# Patient Record
Sex: MALE | Race: WHITE | Employment: PART TIME | URBAN - NONMETROPOLITAN AREA
[De-identification: names, ages, dates, MRNs, and addresses within clinical notes are randomized per-mention and may not be internally consistent; named-entity substitution may affect disease eponyms.]

---

## 2018-04-27 ENCOUNTER — HOSPITAL ENCOUNTER (EMERGENCY)
Age: 33
Discharge: HOME | End: 2018-04-27
Payer: COMMERCIAL

## 2018-04-27 VITALS
HEIGHT: 68 IN | HEART RATE: 65 BPM | BODY MASS INDEX: 33.34 KG/M2 | RESPIRATION RATE: 18 BRPM | WEIGHT: 220 LBS | OXYGEN SATURATION: 99 % | SYSTOLIC BLOOD PRESSURE: 134 MMHG | TEMPERATURE: 98 F | DIASTOLIC BLOOD PRESSURE: 75 MMHG

## 2018-04-27 DIAGNOSIS — L03.012 CELLULITIS OF FINGER OF LEFT HAND: Primary | ICD-10-CM

## 2018-04-27 PROCEDURE — 90715 TDAP VACCINE 7 YRS/> IM: CPT | Performed by: NURSE PRACTITIONER

## 2018-04-27 PROCEDURE — 99213 OFFICE O/P EST LOW 20 MIN: CPT | Performed by: NURSE PRACTITIONER

## 2018-04-27 PROCEDURE — 99213 OFFICE O/P EST LOW 20 MIN: CPT

## 2018-04-27 PROCEDURE — 90471 IMMUNIZATION ADMIN: CPT | Performed by: NURSE PRACTITIONER

## 2018-04-27 PROCEDURE — 6360000002 HC RX W HCPCS: Performed by: NURSE PRACTITIONER

## 2018-04-27 RX ORDER — AMOXICILLIN AND CLAVULANATE POTASSIUM 875; 125 MG/1; MG/1
1 TABLET, FILM COATED ORAL 2 TIMES DAILY
Qty: 20 TABLET | Refills: 0 | Status: SHIPPED | OUTPATIENT
Start: 2018-04-27 | End: 2018-05-07

## 2018-04-27 RX ORDER — ZINC GLUCONATE 50 MG
50 TABLET ORAL DAILY
COMMUNITY
End: 2022-05-16 | Stop reason: ALTCHOICE

## 2018-04-27 RX ADMIN — TETANUS TOXOID, REDUCED DIPHTHERIA TOXOID AND ACELLULAR PERTUSSIS VACCINE, ADSORBED 0.5 ML: 5; 2.5; 8; 8; 2.5 SUSPENSION INTRAMUSCULAR at 17:57

## 2018-04-27 ASSESSMENT — ENCOUNTER SYMPTOMS
NAUSEA: 0
SHORTNESS OF BREATH: 0
ABDOMINAL PAIN: 0
CHEST TIGHTNESS: 0
VOMITING: 0
COLOR CHANGE: 1

## 2018-04-27 ASSESSMENT — PAIN SCALES - GENERAL: PAINLEVEL_OUTOF10: 6

## 2018-04-27 ASSESSMENT — PAIN DESCRIPTION - PAIN TYPE: TYPE: ACUTE PAIN

## 2018-04-27 ASSESSMENT — PAIN DESCRIPTION - LOCATION: LOCATION: FINGER (COMMENT WHICH ONE)

## 2018-04-27 ASSESSMENT — PAIN DESCRIPTION - ORIENTATION: ORIENTATION: LEFT

## 2018-04-27 ASSESSMENT — PAIN DESCRIPTION - DESCRIPTORS: DESCRIPTORS: PRESSURE

## 2018-07-17 ENCOUNTER — OFFICE VISIT (OUTPATIENT)
Dept: FAMILY MEDICINE CLINIC | Age: 33
End: 2018-07-17

## 2018-07-17 VITALS
WEIGHT: 229.4 LBS | DIASTOLIC BLOOD PRESSURE: 84 MMHG | RESPIRATION RATE: 16 BRPM | HEART RATE: 76 BPM | BODY MASS INDEX: 34.77 KG/M2 | SYSTOLIC BLOOD PRESSURE: 132 MMHG | HEIGHT: 68 IN

## 2018-07-17 DIAGNOSIS — N50.812 PAIN IN LEFT TESTICLE: ICD-10-CM

## 2018-07-17 DIAGNOSIS — R39.11 URINARY HESITANCY: ICD-10-CM

## 2018-07-17 DIAGNOSIS — K21.9 GASTROESOPHAGEAL REFLUX DISEASE WITHOUT ESOPHAGITIS: ICD-10-CM

## 2018-07-17 DIAGNOSIS — L30.9 ECZEMA, UNSPECIFIED TYPE: ICD-10-CM

## 2018-07-17 DIAGNOSIS — Z00.00 WELL ADULT EXAM: Primary | ICD-10-CM

## 2018-07-17 LAB
BACTERIA URINE, POC: NORMAL
BILIRUBIN URINE: 0 MG/DL
BLOOD, URINE: NEGATIVE
CASTS URINE, POC: NORMAL
CLARITY: CLEAR
COLOR: YELLOW
CRYSTALS URINE, POC: NORMAL
EPI CELLS URINE, POC: NORMAL
GLUCOSE URINE: NORMAL
HEMOCCULT STL QL: NORMAL
KETONES, URINE: NEGATIVE
LEUKOCYTE EST, POC: NEGATIVE
NITRITE, URINE: NEGATIVE
PH UA: 6.5 (ref 4.5–8)
PROTEIN UA: NEGATIVE
RBC URINE, POC: NORMAL
SPECIFIC GRAVITY UA: 1.01 (ref 1–1.03)
UROBILINOGEN, URINE: NORMAL
WBC URINE, POC: NORMAL
YEAST URINE, POC: NORMAL

## 2018-07-17 PROCEDURE — 82270 OCCULT BLOOD FECES: CPT | Performed by: FAMILY MEDICINE

## 2018-07-17 PROCEDURE — 81000 URINALYSIS NONAUTO W/SCOPE: CPT | Performed by: FAMILY MEDICINE

## 2018-07-17 PROCEDURE — 99395 PREV VISIT EST AGE 18-39: CPT | Performed by: FAMILY MEDICINE

## 2018-07-17 ASSESSMENT — ENCOUNTER SYMPTOMS
CONSTIPATION: 0
SORE THROAT: 0
SHORTNESS OF BREATH: 0
BACK PAIN: 0
NAUSEA: 0
BLOOD IN STOOL: 0
ABDOMINAL PAIN: 0
EYE PAIN: 0
TROUBLE SWALLOWING: 0
CHEST TIGHTNESS: 0
COUGH: 0

## 2018-07-17 ASSESSMENT — PATIENT HEALTH QUESTIONNAIRE - PHQ9
2. FEELING DOWN, DEPRESSED OR HOPELESS: 0
1. LITTLE INTEREST OR PLEASURE IN DOING THINGS: 0
SUM OF ALL RESPONSES TO PHQ QUESTIONS 1-9: 0
SUM OF ALL RESPONSES TO PHQ9 QUESTIONS 1 & 2: 0

## 2018-07-17 NOTE — PROGRESS NOTES
Subjective:      Patient ID: Nirmal Carter is a 26 y.o. male. Urinary  Pressure  Noted    Frequency  Some        Testicle  pain  On  The left        concern  On  Lipids        Spot  On  Back                  Past Medical History:   Diagnosis Date    Hearing loss     Bilateral     Varicose vein of scrotum     Left      Past Surgical History:   Procedure Laterality Date    ANTERIOR CRUCIATE LIGAMENT REPAIR Left     TESTICLE SURGERY Left 1990    VASECTOMY  1-2012    Dr. Stas Lopez Marital status:      Spouse name: N/A    Number of children: N/A    Years of education: N/A     Occupational History    Not on file. Social History Main Topics    Smoking status: Former Smoker     Packs/day: 0.50     Years: 13.00     Types: Cigarettes     Quit date: 7/8/2013    Smokeless tobacco: Never Used    Alcohol use No    Drug use: No      Comment: Weed, Rarely Quit 2014    Sexual activity: Yes     Partners: Female     Other Topics Concern    Not on file     Social History Narrative    No narrative on file     Family History   Problem Relation Age of Onset    High Cholesterol Mother     High Cholesterol Father      Review of Systems   Constitutional: Negative for fatigue and fever. HENT: Negative for congestion, ear pain, postnasal drip, sore throat and trouble swallowing. Eyes: Negative for pain. Respiratory: Negative for cough, chest tightness and shortness of breath. Cardiovascular: Negative for chest pain, palpitations and leg swelling. Gastrointestinal: Negative for abdominal pain, blood in stool, constipation and nausea. Genitourinary: Negative for difficulty urinating, frequency and urgency. Bed  At  9 and  Up  At  4  Am    Musculoskeletal: Negative for arthralgias, back pain, joint swelling and neck stiffness. Skin: Negative for rash. Neurological: Negative for dizziness, weakness and headaches.    Hematological: Negative for reviewed. Assessment:       Diagnosis Orders   1. Well adult exam  Lipid Panel    Glucose   2. Urinary hesitancy  POC URINE with Microscopic   3. Pain in left testicle     4. Eczema, unspecified type     5. Gastroesophageal reflux disease without esophagitis  POCT occult blood stool         Plan:      Current Outpatient Prescriptions   Medication Sig Dispense Refill    zinc gluconate 50 MG tablet Take 50 mg by mouth daily      ibuprofen (ADVIL;MOTRIN) 200 MG tablet Take 600 mg by mouth every 6 hours as needed for Pain       No current facility-administered medications for this visit.           Results for orders placed or performed in visit on 07/17/18   POC URINE with Microscopic   Result Value Ref Range    Color, UA Yellow     Clarity, UA Clear Clear    Glucose, Ur Neg     Bilirubin Urine 0 mg/dL    Ketones, Urine Negative     Specific Gravity, UA 1.015 1.005 - 1.030    Blood, Urine Negative     pH, UA 6.5 4.5 - 8.0    Protein, UA Negative Negative    Nitrite, Urine Negative     Leukocytes, UA Negative     Urobilinogen, Urine Normal     rbc urine, poc none     wbc urine, poc none     bacteria urine, poc none     yeast urine, poc      casts urine, poc      epi cells urine, poc      crystals urine, poc     POCT occult blood stool   Result Value Ref Range    OCCULT BLOOD FECAL      OCCULT BLOOD FECAL      OCCULT BLOOD FECAL         Orders Placed This Encounter   Procedures    POC URINE with Microscopic    POCT occult blood stool        see prn and motriin  Prn  SEE IN ONE

## 2018-07-30 DIAGNOSIS — Z00.00 WELL ADULT EXAM: ICD-10-CM

## 2018-07-30 LAB
CHOLESTEROL, TOTAL: ABNORMAL MG/DL
CHOLESTEROL/HDL RATIO: ABNORMAL
GLUCOSE BLD-MCNC: NORMAL MG/DL
HDLC SERPL-MCNC: ABNORMAL MG/DL (ref 35–70)
LDL CHOLESTEROL CALCULATED: 184 MG/DL (ref 0–160)
TRIGL SERPL-MCNC: ABNORMAL MG/DL
VLDLC SERPL CALC-MCNC: ABNORMAL MG/DL

## 2018-07-31 ENCOUNTER — TELEPHONE (OUTPATIENT)
Dept: FAMILY MEDICINE CLINIC | Age: 33
End: 2018-07-31

## 2018-07-31 DIAGNOSIS — E78.00 PURE HYPERCHOLESTEROLEMIA: ICD-10-CM

## 2018-07-31 NOTE — TELEPHONE ENCOUNTER
Pt called asking if you got his lab results back? He ask if all came back ok?     Please call pt once addressed

## 2018-08-02 PROBLEM — E78.00 PURE HYPERCHOLESTEROLEMIA: Status: ACTIVE | Noted: 2018-08-02

## 2018-08-02 NOTE — TELEPHONE ENCOUNTER
Cholesterol 260 and ldl 184 and want at least 130 so send diet and repeat around first of December , the glu is good at 84 but chol diet and exercise at 20 minutes 3 times a week . Lab on printer  For  December . ? If form for work ?

## 2018-11-30 ENCOUNTER — TELEPHONE (OUTPATIENT)
Dept: FAMILY MEDICINE CLINIC | Age: 33
End: 2018-11-30

## 2018-11-30 RX ORDER — AZITHROMYCIN 250 MG/1
TABLET, FILM COATED ORAL
Qty: 1 PACKET | Refills: 0 | Status: SHIPPED | OUTPATIENT
Start: 2018-11-30 | End: 2020-10-13 | Stop reason: ALTCHOICE

## 2019-02-15 ENCOUNTER — TELEPHONE (OUTPATIENT)
Dept: FAMILY MEDICINE CLINIC | Age: 34
End: 2019-02-15

## 2019-02-15 RX ORDER — AZITHROMYCIN 250 MG/1
TABLET, FILM COATED ORAL
Qty: 1 PACKET | Refills: 0 | Status: SHIPPED | OUTPATIENT
Start: 2019-02-15 | End: 2020-10-13 | Stop reason: ALTCHOICE

## 2020-08-31 PROBLEM — N20.0 RIGHT KIDNEY STONE: Status: ACTIVE | Noted: 2020-08-01

## 2020-10-13 ENCOUNTER — HOSPITAL ENCOUNTER (EMERGENCY)
Age: 35
Discharge: HOME | End: 2020-10-13
Payer: COMMERCIAL

## 2020-10-13 VITALS
TEMPERATURE: 97.5 F | HEIGHT: 69 IN | BODY MASS INDEX: 33.33 KG/M2 | DIASTOLIC BLOOD PRESSURE: 82 MMHG | OXYGEN SATURATION: 97 % | SYSTOLIC BLOOD PRESSURE: 139 MMHG | WEIGHT: 225 LBS | HEART RATE: 89 BPM | RESPIRATION RATE: 16 BRPM

## 2020-10-13 PROCEDURE — 99212 OFFICE O/P EST SF 10 MIN: CPT

## 2020-10-13 PROCEDURE — 99213 OFFICE O/P EST LOW 20 MIN: CPT | Performed by: NURSE PRACTITIONER

## 2020-10-13 RX ORDER — CEFDINIR 300 MG/1
300 CAPSULE ORAL 2 TIMES DAILY
Qty: 20 CAPSULE | Refills: 0 | Status: SHIPPED | OUTPATIENT
Start: 2020-10-13 | End: 2020-10-23

## 2020-10-13 ASSESSMENT — ENCOUNTER SYMPTOMS
NAUSEA: 0
COUGH: 1
SINUS PRESSURE: 1
SORE THROAT: 0
VOMITING: 0
SHORTNESS OF BREATH: 0

## 2020-10-13 NOTE — ED PROVIDER NOTES
[facility]  Urgent Care Encounter       CHIEF COMPLAINT       Chief Complaint   Patient presents with    Cough     productive pt has picture of bottle he has sputum in       Nurses Notes reviewed and I agree except as noted in the HPI. HISTORY OF PRESENT ILLNESS   Joseph Cabral is a 38 y.o. male who presents relation of sinus pressure, congestion and a productive cough that is been ongoing for the past 6 days. Patient denies any fever, chills, nausea, or vomiting. He states that he has not been taking any medications at home for this. He denies any known sick exposures. The history is provided by the patient. REVIEW OF SYSTEMS     Review of Systems   Constitutional: Negative for chills and fever. HENT: Positive for congestion and sinus pressure. Negative for sore throat. Respiratory: Positive for cough. Negative for shortness of breath. Cardiovascular: Negative for chest pain. Gastrointestinal: Negative for nausea and vomiting. Musculoskeletal: Negative for myalgias. Skin: Negative for rash. Allergic/Immunologic: Negative for environmental allergies. Neurological: Negative for headaches. PAST MEDICAL HISTORY         Diagnosis Date    Hearing loss     Bilateral     Right kidney stone 08/2020    Varicose vein of scrotum     Left        SURGICALHISTORY     Patient  has a past surgical history that includes Vasectomy (1-2012); Anterior cruciate ligament repair (Left, ); and Testicle surgery (Left, 1990). CURRENT MEDICATIONS       Previous Medications    IBUPROFEN (ADVIL;MOTRIN) 200 MG TABLET    Take 600 mg by mouth every 6 hours as needed for Pain    ZINC GLUCONATE 50 MG TABLET    Take 50 mg by mouth daily       ALLERGIES     Patient is is allergic to vicodin [hydrocodone-acetaminophen].     Patients   Immunization History   Administered Date(s) Administered    Tdap (Boostrix, Adacel) 04/27/2018    Tetanus 10/01/2000       FAMILY HISTORY Patient's family history includes High Cholesterol in his father and mother. SOCIAL HISTORY     Patient  reports that he quit smoking about 7 years ago. His smoking use included cigarettes. He has a 6.50 pack-year smoking history. He has never used smokeless tobacco. He reports that he does not drink alcohol or use drugs. PHYSICAL EXAM     ED TRIAGE VITALS  BP: 139/82, Temp: 97.5 °F (36.4 °C), Pulse: 89, Resp: 16, SpO2: 97 %,Estimated body mass index is 33.23 kg/m² as calculated from the following:    Height as of this encounter: 5' 9\" (1.753 m). Weight as of this encounter: 225 lb (102.1 kg). ,No LMP for male patient. Physical Exam  Vitals signs and nursing note reviewed. Constitutional:       General: He is not in acute distress. Appearance: He is well-developed. He is not diaphoretic. HENT:      Right Ear: Tympanic membrane is bulging. Tympanic membrane is not erythematous. Left Ear: Tympanic membrane is bulging. Tympanic membrane is not erythematous. Nose:      Right Sinus: Frontal sinus tenderness present. No maxillary sinus tenderness. Left Sinus: Frontal sinus tenderness present. No maxillary sinus tenderness. Mouth/Throat:      Mouth: Mucous membranes are moist.      Pharynx: Oropharynx is clear. Tonsils: No tonsillar exudate. 0 on the right. 0 on the left. Eyes:      Conjunctiva/sclera:      Right eye: Right conjunctiva is not injected. Left eye: Left conjunctiva is not injected. Pupils: Pupils are equal.   Neck:      Musculoskeletal: Normal range of motion. Cardiovascular:      Rate and Rhythm: Normal rate and regular rhythm. Heart sounds: No murmur. Pulmonary:      Effort: Pulmonary effort is normal. No respiratory distress. Breath sounds: Normal breath sounds. Musculoskeletal:      Right knee: He exhibits normal range of motion. Left knee: He exhibits normal range of motion.    Lymphadenopathy:      Head:      Right side of head: No tonsillar adenopathy. Left side of head: No tonsillar adenopathy. Cervical: No cervical adenopathy. Skin:     General: Skin is warm. Findings: No rash. Neurological:      Mental Status: He is alert and oriented to person, place, and time. Psychiatric:         Behavior: Behavior normal.         DIAGNOSTIC RESULTS     Labs:No results found for this visit on 10/13/20. IMAGING:    No orders to display         EKG:  none    URGENT CARE COURSE:     Vitals:    10/13/20 0856   BP: 139/82   Pulse: 89   Resp: 16   Temp: 97.5 °F (36.4 °C)   SpO2: 97%   Weight: 225 lb (102.1 kg)   Height: 5' 9\" (1.753 m)       Medications - No data to display         PROCEDURES:  None    FINAL IMPRESSION      1. Acute frontal sinusitis, recurrence not specified          DISPOSITION/ PLAN       Discussed with the patient that exam is consistent with frontal sinusitis at this time. Discussed that his lung sounds are clear and I believe that his productive cough is likely related to drainage from his sinuses. Discussed with the patient that he should present to the ER for any worsening symptoms we will plan to treat with oral antibiotics and he is advised to use Mucinex and Sudafed at home. Patient was offered a Covid test today, however he declined stating that he does not believe this is the case and that he will monitor her symptoms at home. He is advised to follow-up on an outpatient basis as needed and is agreeable to plan as discussed. PATIENT REFERRED TO:  Kaushik Weinstein MD  [address] [place] [address] / [facility] [id number]      DISCHARGE MEDICATIONS:  New Prescriptions    CEFDINIR (OMNICEF) 300 MG CAPSULE    Take 1 capsule by mouth 2 times daily for 10 days       Discontinued Medications    AZITHROMYCIN (ZITHROMAX) 250 MG TABLET    Take 2 tabs (500 mg) on Day 1, and take 1 tab (250 mg) on days 2 through 5.     AZITHROMYCIN (ZITHROMAX) 250 MG TABLET    Take 2 tabs (500 mg) on Day 1, and take 1 tab (250 mg) on days 2 through 5.        Current Discharge Medication List          BONI Rivas CNP    (Please note that portions of this note were completed with a voice recognition program. Efforts were made to edit the dictations but occasionally words are mis-transcribed.)           BONI Rivas CNP  10/13/20 5147

## 2020-10-15 ENCOUNTER — CARE COORDINATION (OUTPATIENT)
Dept: FAMILY MEDICINE CLINIC | Age: 35
End: 2020-10-15

## 2020-10-15 NOTE — CARE COORDINATION
Ambulatory Care Coordination  ED Follow up Call    Reason for ED visit:  Sinus congestion, cough   Status:     improved    Did you call your PCP prior to going to the ED? No      Did you receive a discharge instructions from the Emergency Room? Yes  Review of Instructions:     Understands what to report/when to return?:  Yes   Understands discharge instructions?:  Yes   Following discharge instructions?:  Yes   If not why? Are there any new complaints of pain? No  New Pain Meds? No    Constipation prophylaxis needed? No    If you have a wound is the dressing clean, dry, and intact? N/A  Understands wound care regimen? N/A    Are there any other complaints/concerns that you wish to tell your provider? FU appts/Provider: declined   No future appointments. He is feeling better since he started his antibiotic but he knows he needs to follow up with the  About his cholesterol. New Medications?:   Yes      Medication Reconciliation by phone - Yes  Understands Medications? Yes  Taking Medications? Yes  Can you swallow your pills? Yes    Any further needs in the home i.e. Equipment?   No    Link to services in community?:  No   Which services:

## 2022-01-10 ENCOUNTER — HOSPITAL ENCOUNTER (EMERGENCY)
Age: 37
Discharge: HOME | End: 2022-01-10
Payer: COMMERCIAL

## 2022-01-10 VITALS
DIASTOLIC BLOOD PRESSURE: 72 MMHG | HEART RATE: 88 BPM | WEIGHT: 225 LBS | HEIGHT: 68 IN | SYSTOLIC BLOOD PRESSURE: 137 MMHG | TEMPERATURE: 97.4 F | RESPIRATION RATE: 16 BRPM | BODY MASS INDEX: 34.1 KG/M2 | OXYGEN SATURATION: 99 %

## 2022-01-10 DIAGNOSIS — J02.9 ACUTE PHARYNGITIS, UNSPECIFIED ETIOLOGY: Primary | ICD-10-CM

## 2022-01-10 LAB
GROUP A STREP CULTURE, REFLEX: NEGATIVE
REFLEX THROAT C + S: NORMAL

## 2022-01-10 PROCEDURE — 99213 OFFICE O/P EST LOW 20 MIN: CPT

## 2022-01-10 PROCEDURE — 87880 STREP A ASSAY W/OPTIC: CPT

## 2022-01-10 PROCEDURE — 99213 OFFICE O/P EST LOW 20 MIN: CPT | Performed by: NURSE PRACTITIONER

## 2022-01-10 PROCEDURE — G0463 HOSPITAL OUTPT CLINIC VISIT: HCPCS

## 2022-01-10 PROCEDURE — 87070 CULTURE OTHR SPECIMN AEROBIC: CPT

## 2022-01-10 ASSESSMENT — ENCOUNTER SYMPTOMS
NAUSEA: 0
SORE THROAT: 1
SHORTNESS OF BREATH: 0
DIARRHEA: 0
VOMITING: 0
COUGH: 0

## 2022-01-10 ASSESSMENT — PAIN SCALES - GENERAL: PAINLEVEL_OUTOF10: 8

## 2022-01-10 ASSESSMENT — PAIN DESCRIPTION - LOCATION: LOCATION: THROAT

## 2022-01-10 NOTE — ED PROVIDER NOTES
[facility]  Urgent Care Encounter       CHIEF COMPLAINT       Chief Complaint   Patient presents with    Pharyngitis       Nurses Notes reviewed and I agree except as noted in the HPI. HISTORY OF PRESENT ILLNESS   Alon Salinas is a 43 y.o. male who presents for evaluation of sore throat with mild congestion that has been ongoing for the past 3 days. Patient denies any fever, chills, headaches, body aches, cough or congestion. He denies any known sick exposures. Patient denies any concern for COVID at this time. States that he would just like to be tested for strep throat. The history is provided by the patient. REVIEW OF SYSTEMS     Review of Systems   Constitutional: Negative for chills and fever. HENT: Positive for congestion and sore throat. Respiratory: Negative for cough and shortness of breath. Cardiovascular: Negative for chest pain. Gastrointestinal: Negative for diarrhea, nausea and vomiting. Musculoskeletal: Negative for arthralgias and myalgias. Skin: Negative for rash. Allergic/Immunologic: Negative for immunocompromised state. Neurological: Negative for headaches. PAST MEDICAL HISTORY         Diagnosis Date    Hearing loss     Bilateral     Right kidney stone 08/2020    Varicose vein of scrotum     Left        SURGICALHISTORY     Patient  has a past surgical history that includes Vasectomy (1-2012); Anterior cruciate ligament repair (Left, ); and Testicle surgery (Left, 1990). CURRENT MEDICATIONS       Previous Medications    IBUPROFEN (ADVIL;MOTRIN) 200 MG TABLET    Take 400 mg by mouth every 6 hours as needed for Pain     ZINC GLUCONATE 50 MG TABLET    Take 50 mg by mouth daily       ALLERGIES     Patient is is allergic to vicodin [hydrocodone-acetaminophen].     Patients   Immunization History   Administered Date(s) Administered    Tdap (Boostrix, Adacel) 04/27/2018    Tetanus 10/01/2000       FAMILY HISTORY     Patient's family history includes High Cholesterol in his father and mother. SOCIAL HISTORY     Patient  reports that he quit smoking about 8 years ago. His smoking use included cigarettes. He has a 6.50 pack-year smoking history. He has never used smokeless tobacco. He reports that he does not drink alcohol and does not use drugs. PHYSICAL EXAM     ED TRIAGE VITALS  BP: 137/72, Temp: 97.4 °F (36.3 °C), Pulse: 88, Resp: 16, SpO2: 99 %,Estimated body mass index is 34.21 kg/m² as calculated from the following:    Height as of this encounter: 5' 8\" (1.727 m). Weight as of this encounter: 225 lb (102.1 kg). ,No LMP for male patient. Physical Exam  Vitals and nursing note reviewed. Constitutional:       General: He is not in acute distress. Appearance: He is well-developed. He is not diaphoretic. HENT:      Right Ear: Tympanic membrane and ear canal normal.      Left Ear: Tympanic membrane and ear canal normal.      Mouth/Throat:      Pharynx: Posterior oropharyngeal erythema present. Tonsils: No tonsillar exudate. 1+ on the right. 1+ on the left. Eyes:      Conjunctiva/sclera:      Right eye: Right conjunctiva is not injected. Left eye: Left conjunctiva is not injected. Pupils: Pupils are equal.   Cardiovascular:      Rate and Rhythm: Normal rate and regular rhythm. Heart sounds: No murmur heard. Pulmonary:      Effort: Pulmonary effort is normal. No respiratory distress. Breath sounds: Normal breath sounds. Musculoskeletal:      Cervical back: Normal range of motion. Right knee: Normal range of motion. Left knee: Normal range of motion. Lymphadenopathy:      Head:      Right side of head: No tonsillar adenopathy. Left side of head: No tonsillar adenopathy. Cervical: No cervical adenopathy. Skin:     General: Skin is warm. Findings: No rash. Neurological:      Mental Status: He is alert and oriented to person, place, and time.    Psychiatric: Behavior: Behavior normal.         DIAGNOSTIC RESULTS     Labs:  Results for orders placed or performed during the hospital encounter of 01/10/22   STREP A ANTIGEN   Result Value Ref Range    GROUP A STREP CULTURE, REFLEX Negative        IMAGING:    No orders to display         EKG:      URGENT CARE COURSE:     Vitals:    01/10/22 0810   BP: 137/72   Pulse: 88   Resp: 16   Temp: 97.4 °F (36.3 °C)   SpO2: 99%   Weight: 225 lb (102.1 kg)   Height: 5' 8\" (1.727 m)       Medications - No data to display         PROCEDURES:  None    FINAL IMPRESSION      1. Acute pharyngitis, unspecified etiology          DISPOSITION/ PLAN     Strep test was negative at this time. I discussed with the patient that exam is consistent with a viral type illness. Patient continues to decline any further testing for influenza or COVID and states that he will monitor his symptoms at home. He is advised use Tylenol and ibuprofen and to rest and hydrate. He is agreeable to plan as discussed.       PATIENT REFERRED TO:  Daryl Solorio MD  [facility] / [address] [place] [id number]      DISCHARGE MEDICATIONS:  New Prescriptions    No medications on file       Discontinued Medications    No medications on file       Current Discharge Medication List          BONI Vera CNP    (Please note that portions of this note were completed with a voice recognition program. Efforts were made to edit the dictations but occasionally words are mis-transcribed.)          BONI Vera CNP  01/10/22 3300

## 2022-01-13 LAB — THROAT/NOSE CULTURE: NORMAL

## 2022-05-16 ENCOUNTER — OFFICE VISIT (OUTPATIENT)
Dept: FAMILY MEDICINE CLINIC | Age: 37
End: 2022-05-16

## 2022-05-16 VITALS
DIASTOLIC BLOOD PRESSURE: 84 MMHG | RESPIRATION RATE: 16 BRPM | HEIGHT: 68 IN | SYSTOLIC BLOOD PRESSURE: 136 MMHG | HEART RATE: 64 BPM | WEIGHT: 211.5 LBS | BODY MASS INDEX: 32.05 KG/M2

## 2022-05-16 DIAGNOSIS — L03.317 CELLULITIS OF BUTTOCK: Primary | ICD-10-CM

## 2022-05-16 DIAGNOSIS — N20.0 RIGHT KIDNEY STONE: ICD-10-CM

## 2022-05-16 DIAGNOSIS — E78.41 ELEVATED LIPOPROTEIN(A): ICD-10-CM

## 2022-05-16 PROCEDURE — 99203 OFFICE O/P NEW LOW 30 MIN: CPT | Performed by: FAMILY MEDICINE

## 2022-05-16 RX ORDER — CEPHALEXIN 500 MG/1
500 CAPSULE ORAL 3 TIMES DAILY
Qty: 21 CAPSULE | Refills: 0 | Status: SHIPPED | OUTPATIENT
Start: 2022-05-16 | End: 2022-10-26 | Stop reason: ALTCHOICE

## 2022-05-16 SDOH — ECONOMIC STABILITY: FOOD INSECURITY: WITHIN THE PAST 12 MONTHS, YOU WORRIED THAT YOUR FOOD WOULD RUN OUT BEFORE YOU GOT MONEY TO BUY MORE.: NEVER TRUE

## 2022-05-16 SDOH — ECONOMIC STABILITY: FOOD INSECURITY: WITHIN THE PAST 12 MONTHS, THE FOOD YOU BOUGHT JUST DIDN'T LAST AND YOU DIDN'T HAVE MONEY TO GET MORE.: NEVER TRUE

## 2022-05-16 ASSESSMENT — ENCOUNTER SYMPTOMS
COUGH: 0
BACK PAIN: 0
BLOOD IN STOOL: 0
NAUSEA: 0
TROUBLE SWALLOWING: 0
EYE PAIN: 0
SHORTNESS OF BREATH: 0
SORE THROAT: 0
CONSTIPATION: 0
ABDOMINAL PAIN: 0
RHINORRHEA: 0
NAIL CHANGES: 0
CHEST TIGHTNESS: 0

## 2022-05-16 ASSESSMENT — PATIENT HEALTH QUESTIONNAIRE - PHQ9
2. FEELING DOWN, DEPRESSED OR HOPELESS: 0
SUM OF ALL RESPONSES TO PHQ QUESTIONS 1-9: 0
SUM OF ALL RESPONSES TO PHQ9 QUESTIONS 1 & 2: 0
SUM OF ALL RESPONSES TO PHQ QUESTIONS 1-9: 0
1. LITTLE INTEREST OR PLEASURE IN DOING THINGS: 0

## 2022-05-16 ASSESSMENT — SOCIAL DETERMINANTS OF HEALTH (SDOH): HOW HARD IS IT FOR YOU TO PAY FOR THE VERY BASICS LIKE FOOD, HOUSING, MEDICAL CARE, AND HEATING?: NOT HARD AT ALL

## 2022-05-16 NOTE — PROGRESS NOTES
Subjective:      Patient ID: Chinita Kawasaki is a 42 y.o. male. In  2022  soon  erectil  dysf    Rash  This is a new problem. The current episode started in the past 7 days. The problem has been gradually worsening since onset. The affected locations include the left buttock ( left  perianal  area ). The rash is characterized by pain and swelling. He was exposed to nothing. Pertinent negatives include no congestion, cough, eye pain, fatigue, fever, joint pain, nail changes, rhinorrhea, shortness of breath or sore throat. Past treatments include nothing. The treatment provided no relief.      Past Medical History:   Diagnosis Date    Hearing loss     Bilateral     Right kidney stone 2020    Varicose vein of scrotum     Left      Past Surgical History:   Procedure Laterality Date    ANTERIOR CRUCIATE LIGAMENT REPAIR Left     TESTICLE SURGERY Left     VASECTOMY      Dr. Gallegos [place] History     Socioeconomic History    Marital status:      Spouse name: Not on file    Number of children: Not on file    Years of education: Not on file    Highest education level: Not on file   Occupational History    Not on file   Tobacco Use    Smoking status: Former Smoker     Packs/day: 0.50     Years: 13.00     Pack years: 6.50     Types: Cigarettes     Quit date: 2013     Years since quittin.8    Smokeless tobacco: Never Used   Vaping Use    Vaping Use: Never used   Substance and Sexual Activity    Alcohol use: No    Drug use: No     Comment: Weed, Rarely Quit     Sexual activity: Yes     Partners: Female   Other Topics Concern    Not on file   Social History Narrative    Not on file     Social Determinants of Health     Financial Resource Strain: Low Risk     Difficulty of Paying Living Expenses: Not hard at all   Food Insecurity: No Food Insecurity    Worried About [address] in the Last Year: Never true    [facility] in the Last Year: Never true Psychiatric/Behavioral: Negative for behavioral problems, dysphoric mood and sleep disturbance. /84 (Site: Right Upper Arm, Position: Sitting, Cuff Size: Medium Adult)   Pulse 64   Resp 16   Ht 5' 8\" (1.727 m)   Wt 211 lb 8 oz (95.9 kg)   BMI 32.16 kg/m²   Objective:   Physical Exam  Vitals and nursing note reviewed. Constitutional:       Appearance: He is well-developed. HENT:      Head: Normocephalic and atraumatic. Right Ear: External ear normal.      Left Ear: External ear normal.      Nose: Nose normal.   Eyes:      Conjunctiva/sclera: Conjunctivae normal.      Pupils: Pupils are equal, round, and reactive to light. Comments: Fundi nl   Neck:      Thyroid: No thyromegaly. Cardiovascular:      Rate and Rhythm: Normal rate and regular rhythm. Heart sounds: Normal heart sounds. Pulmonary:      Effort: Pulmonary effort is normal.      Breath sounds: Normal breath sounds. No wheezing or rales. Abdominal:      General: Bowel sounds are normal.      Palpations: Abdomen is soft. There is no mass. Tenderness: There is no abdominal tenderness. Genitourinary:     Comments:   Perianal  Area at  2  oclock   Area  small  Ingrown  Hair   Musculoskeletal:         General: Normal range of motion. Cervical back: Normal range of motion and neck supple. Lymphadenopathy:      Cervical: No cervical adenopathy. Skin:     General: Skin is warm and dry. Findings: No rash. Neurological:      Mental Status: He is alert and oriented to person, place, and time. Cranial Nerves: No cranial nerve deficit. Deep Tendon Reflexes: Reflexes are normal and symmetric. Assessment:       Diagnosis Orders   1. Cellulitis of buttock  cephALEXin (KEFLEX) 500 MG capsule   2. Elevated lipoprotein(a)  Lipid Panel   3.  Right kidney stone           Plan:      Current Outpatient Medications   Medication Sig Dispense Refill    cephALEXin (KEFLEX) 500 MG capsule Take 1 capsule by mouth 3 times daily 21 capsule 0    ibuprofen (ADVIL;MOTRIN) 200 MG tablet Take 400 mg by mouth every 6 hours as needed for Pain        No current facility-administered medications for this visit.          Orders Placed This Encounter   Procedures    Lipid Panel     Standing Status:   Future     Standing Expiration Date:   5/16/2023     Order Specific Question:   Is Patient Fasting?/# of Hours     Answer:   YES 12 HOURS           See prkylee Han MD

## 2022-05-25 ENCOUNTER — TELEPHONE (OUTPATIENT)
Dept: FAMILY MEDICINE CLINIC | Age: 37
End: 2022-05-25

## 2022-05-25 NOTE — TELEPHONE ENCOUNTER
Pt called requesting a letter to be excused from jury duty because of his tinnitus which resulted in hearing loss. 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     Pt can be reached at [phone]

## 2022-05-26 NOTE — TELEPHONE ENCOUNTER
I believe by definition there are diagnosis that are acceptable to be excused as cardiac or lung or diabetic if on insulin plus with his hearing he has no hearing aide so not able to do    Please inform

## 2022-10-26 ENCOUNTER — OFFICE VISIT (OUTPATIENT)
Dept: FAMILY MEDICINE CLINIC | Age: 37
End: 2022-10-26

## 2022-10-26 VITALS
HEIGHT: 68 IN | SYSTOLIC BLOOD PRESSURE: 134 MMHG | DIASTOLIC BLOOD PRESSURE: 78 MMHG | RESPIRATION RATE: 12 BRPM | HEART RATE: 60 BPM | WEIGHT: 217.25 LBS | BODY MASS INDEX: 32.92 KG/M2

## 2022-10-26 DIAGNOSIS — E78.00 PURE HYPERCHOLESTEROLEMIA: ICD-10-CM

## 2022-10-26 DIAGNOSIS — R07.89 CHEST PAIN, ATYPICAL: Primary | ICD-10-CM

## 2022-10-26 DIAGNOSIS — J30.1 SEASONAL ALLERGIC RHINITIS DUE TO POLLEN: ICD-10-CM

## 2022-10-26 LAB
CHOLESTEROL, TOTAL: 275 MG/DL
CHOLESTEROL/HDL RATIO: ABNORMAL
HDLC SERPL-MCNC: 49 MG/DL (ref 35–70)
LDL CHOLESTEROL CALCULATED: 188 MG/DL (ref 0–160)
NONHDLC SERPL-MCNC: ABNORMAL MG/DL
TRIGL SERPL-MCNC: 203 MG/DL
VLDLC SERPL CALC-MCNC: 38 MG/DL

## 2022-10-26 PROCEDURE — 99214 OFFICE O/P EST MOD 30 MIN: CPT | Performed by: FAMILY MEDICINE

## 2022-10-26 PROCEDURE — 93000 ELECTROCARDIOGRAM COMPLETE: CPT | Performed by: FAMILY MEDICINE

## 2022-10-26 ASSESSMENT — ENCOUNTER SYMPTOMS
EYE PAIN: 0
SHORTNESS OF BREATH: 0
COUGH: 0
CONSTIPATION: 0
CHEST TIGHTNESS: 0
TROUBLE SWALLOWING: 0
BACK PAIN: 0
ABDOMINAL PAIN: 0
BLOOD IN STOOL: 0
NAUSEA: 0
SORE THROAT: 0

## 2022-10-26 NOTE — PROGRESS NOTES
Subjective:      Patient ID: Elin Del Rio is a 42 y.o. male. Other  This is a new problem. The current episode started more than 1 month ago (once   in past  month). Episode frequency: 2  times  only. Progression since onset: last   for  5   minutes. Associated symptoms include congestion. Pertinent negatives include no abdominal pain, arthralgias, chest pain, coughing, fatigue, fever, headaches, joint swelling, nausea, rash, sore throat or weakness. Nothing aggravates the symptoms. He has tried nothing for the symptoms. The treatment provided mild relief. Hyperlipidemia  This is a chronic problem. The current episode started more than 1 month ago. The problem is uncontrolled. Pertinent negatives include no chest pain or shortness of breath. Current antihyperlipidemic treatment includes diet change. The current treatment provides mild improvement of lipids. There are no compliance problems.     Past Medical History:   Diagnosis Date    Hearing loss     Bilateral     Right kidney stone 2020    Varicose vein of scrotum     Left      Past Surgical History:   Procedure Laterality Date    ANTERIOR CRUCIATE LIGAMENT REPAIR Left     TESTICLE SURGERY Left     VASECTOMY      Dr. Liane Krabbe History    Marital status:      Spouse name: Not on file    Number of children: Not on file    Years of education: Not on file    Highest education level: Not on file   Occupational History    Not on file   Tobacco Use    Smoking status: Former     Packs/day: 0.50     Years: 13.00     Pack years: 6.50     Types: Cigarettes     Quit date: 2013     Years since quittin.3    Smokeless tobacco: Never   Vaping Use    Vaping Use: Never used   Substance and Sexual Activity    Alcohol use: No    Drug use: No     Comment: Weed, Rarely Quit     Sexual activity: Yes     Partners: Female   Other Topics Concern    Not on file   Social History Narrative    Not on file     Social Determinants of Health     Financial Resource Strain: Low Risk     Difficulty of Paying Living Expenses: Not hard at all   Food Insecurity: No Food Insecurity    Worried About Running Out of Food in the Last Year: Never true    Ran Out of Food in the Last Year: Never true   Transportation Needs: Not on file   Physical Activity: Not on file   Stress: Not on file   Social Connections: Not on file   Intimate Partner Violence: Not on file   Housing Stability: Not on file     Family History   Problem Relation Age of Onset    High Cholesterol Mother     High Cholesterol Father       Review of Systems   Constitutional:  Negative for fatigue and fever. HENT:  Positive for congestion. Negative for ear pain, postnasal drip, sore throat and trouble swallowing. Eyes:  Negative for pain. Respiratory:  Negative for cough, chest tightness and shortness of breath. Cardiovascular:  Negative for chest pain, palpitations and leg swelling. Gastrointestinal:  Negative for abdominal pain, blood in stool, constipation and nausea. Genitourinary:  Negative for difficulty urinating, frequency and urgency. Musculoskeletal:  Negative for arthralgias, back pain, joint swelling and neck stiffness. At  larynx  with pain and   tightness and  felt  in  upper  chest       Vague pain        No  dyspnea    Skin:  Negative for rash. Neurological:  Negative for dizziness, weakness and headaches. Hematological:  Negative for adenopathy. Does not bruise/bleed easily. Psychiatric/Behavioral:  Negative for behavioral problems, dysphoric mood and sleep disturbance. /78 (Site: Right Upper Arm, Position: Sitting, Cuff Size: Medium Adult)   Pulse 60   Resp 12   Ht 5' 8\" (1.727 m)   Wt 217 lb 4 oz (98.5 kg)   BMI 33.03 kg/m²    Objective:   Physical Exam  Vitals and nursing note reviewed. Constitutional:       Appearance: He is well-developed. HENT:      Head: Normocephalic and atraumatic.       Right Ear: External ear normal.      Left Ear: External ear normal.      Nose: Congestion present. Mouth/Throat:      Pharynx: Posterior oropharyngeal erythema present. Comments: Post pharynx drainage  and  redness  noted    Eyes:      Conjunctiva/sclera: Conjunctivae normal.      Pupils: Pupils are equal, round, and reactive to light. Comments: Fundi nl   Neck:      Thyroid: No thyromegaly. Cardiovascular:      Rate and Rhythm: Normal rate and regular rhythm. Heart sounds: Normal heart sounds. Pulmonary:      Effort: Pulmonary effort is normal.      Breath sounds: Normal breath sounds. No wheezing or rales. Abdominal:      General: Bowel sounds are normal.      Palpations: Abdomen is soft. There is no mass. Tenderness: There is no abdominal tenderness. Musculoskeletal:         General: Normal range of motion. Cervical back: Normal range of motion and neck supple. Lymphadenopathy:      Cervical: No cervical adenopathy. Skin:     General: Skin is warm and dry. Findings: No rash. Neurological:      Mental Status: He is alert and oriented to person, place, and time. Cranial Nerves: No cranial nerve deficit. Deep Tendon Reflexes: Reflexes are normal and symmetric.     ,  ekg  wnl as  normal  pr and  qrs and  st  segment   Assessment:        ICD-10-CM    1. Chest pain, atypical  R07.89 EKG 12 Lead      2. Seasonal allergic rhinitis due to pollen  J30.1       3. Pure hypercholesterolemia  E78.00              Plan:      Current Outpatient Medications   Medication Sig Dispense Refill    ibuprofen (ADVIL;MOTRIN) 200 MG tablet Take 400 mg by mouth every 6 hours as needed for Pain        No current facility-administered medications for this visit.           Outpatient Encounter Medications as of 10/26/2022   Medication Sig Dispense Refill    ibuprofen (ADVIL;MOTRIN) 200 MG tablet Take 400 mg by mouth every 6 hours as needed for Pain       [DISCONTINUED] cephALEXin (KEFLEX) 500 MG capsule Take 1 capsule by mouth 3 times daily 21 capsule 0     No facility-administered encounter medications on file as of 10/26/2022. Orders Placed This Encounter   Procedures    EKG 12 Lead     Order Specific Question:   Reason for Exam?     Answer:    Atypical chest pain          Aypical    chest pain  in  throat       allergy  related  as  drainage       Chol    up and     may  need   meds       [facility]  for  darcie Lopez MD

## 2022-10-27 DIAGNOSIS — E78.41 ELEVATED LIPOPROTEIN(A): ICD-10-CM

## 2022-10-28 ENCOUNTER — TELEPHONE (OUTPATIENT)
Dept: FAMILY MEDICINE CLINIC | Age: 37
End: 2022-10-28

## 2022-10-28 DIAGNOSIS — E78.00 PURE HYPERCHOLESTEROLEMIA: Primary | ICD-10-CM

## 2022-10-28 RX ORDER — ROSUVASTATIN CALCIUM 10 MG/1
10 TABLET, COATED ORAL NIGHTLY
Qty: 30 TABLET | Refills: 3 | Status: SHIPPED | OUTPATIENT
Start: 2022-10-28 | End: 2022-11-23

## 2022-10-28 NOTE — TELEPHONE ENCOUNTER
----- Message from Stephanie Bustos MD sent at 10/28/2022  5:51 AM EDT -----  Chol 275  still and  ldl  188 so do need to check liver function now and if ok start crestor 10 mg a day  Hepatic panel on printer

## 2022-10-28 NOTE — TELEPHONE ENCOUNTER
Maury Dove informed by [employer] labs to [employer] for pt and he agreed to [employer]. Sent RX to pharmacy for pt.     Date of last visit:  10/26/2022  Date of next visit:  1/16/2023    Requested Prescriptions     Signed Prescriptions Disp Refills    rosuvastatin (CRESTOR) 10 MG tablet 30 tablet 3     Sig: Take 1 tablet by mouth nightly     Authorizing Provider: Jennifer Corado     Ordering User: Sherine Francois

## 2022-10-31 LAB
A/G RATIO: NORMAL
ALBUMIN SERPL-MCNC: 4.9 G/DL
ALP BLD-CCNC: 88 U/L
ALT SERPL-CCNC: 23 U/L
AST SERPL-CCNC: 22 U/L
BILIRUB SERPL-MCNC: 0.7 MG/DL (ref 0.1–1.4)
BILIRUBIN DIRECT: 0.15 MG/DL
BILIRUBIN, INDIRECT: NORMAL
GLOBULIN: NORMAL
PROTEIN TOTAL: 7.2 G/DL

## 2022-11-01 DIAGNOSIS — E78.00 PURE HYPERCHOLESTEROLEMIA: ICD-10-CM

## 2022-11-02 ENCOUNTER — TELEPHONE (OUTPATIENT)
Dept: FAMILY MEDICINE CLINIC | Age: 37
End: 2022-11-02

## 2022-11-02 DIAGNOSIS — E78.00 PURE HYPERCHOLESTEROLEMIA: Primary | ICD-10-CM

## 2022-11-02 NOTE — TELEPHONE ENCOUNTER
----- Message from Jt Valenzuela MD sent at 11/2/2022  8:51 AM EDT -----  Call as liver functions normal and  start  crestor and around  2-1-2023 repeat liver functions and lipid panel to see  how the meds are doing       Labs  on printer and  do  about  2-1-2023

## 2022-11-22 DIAGNOSIS — E78.00 PURE HYPERCHOLESTEROLEMIA: ICD-10-CM

## 2022-11-22 NOTE — TELEPHONE ENCOUNTER
Date of last visit:  10/26/2022  Date of next visit:  1/16/2023    Requested Prescriptions     Pending Prescriptions Disp Refills    rosuvastatin (CRESTOR) 10 MG tablet [Pharmacy Med Name: ROSUVASTATIN CALCIUM 10 MG TAB] 30 tablet 3     Sig: TAKE 1 TABLET BY MOUTH NIGHTLY

## 2022-11-23 RX ORDER — ROSUVASTATIN CALCIUM 10 MG/1
10 TABLET, COATED ORAL NIGHTLY
Qty: 30 TABLET | Refills: 3 | Status: SHIPPED | OUTPATIENT
Start: 2022-11-23

## 2022-11-28 ENCOUNTER — TELEPHONE (OUTPATIENT)
Dept: FAMILY MEDICINE CLINIC | Age: 37
End: 2022-11-28

## 2022-11-28 DIAGNOSIS — J06.9 VIRAL URI: Primary | ICD-10-CM

## 2022-11-28 RX ORDER — AZITHROMYCIN 250 MG/1
250 TABLET, FILM COATED ORAL SEE ADMIN INSTRUCTIONS
Qty: 6 TABLET | Refills: 0 | Status: SHIPPED | OUTPATIENT
Start: 2022-11-28 | End: 2022-12-03

## 2022-11-28 NOTE — TELEPHONE ENCOUNTER
Pt called req ATB for   sinus congestion,cough,thick green chest mucus,yellow sinus drainage    [facility]

## 2023-01-16 ENCOUNTER — OFFICE VISIT (OUTPATIENT)
Dept: FAMILY MEDICINE CLINIC | Age: 38
End: 2023-01-16

## 2023-01-16 VITALS
SYSTOLIC BLOOD PRESSURE: 130 MMHG | HEIGHT: 68 IN | WEIGHT: 230.2 LBS | DIASTOLIC BLOOD PRESSURE: 72 MMHG | HEART RATE: 80 BPM | BODY MASS INDEX: 34.89 KG/M2 | RESPIRATION RATE: 14 BRPM

## 2023-01-16 DIAGNOSIS — E78.00 PURE HYPERCHOLESTEROLEMIA: ICD-10-CM

## 2023-01-16 DIAGNOSIS — Z00.00 WELLNESS EXAMINATION: Primary | ICD-10-CM

## 2023-01-16 DIAGNOSIS — J30.1 SEASONAL ALLERGIC RHINITIS DUE TO POLLEN: ICD-10-CM

## 2023-01-16 DIAGNOSIS — N20.0 RIGHT KIDNEY STONE: ICD-10-CM

## 2023-01-16 PROCEDURE — 99395 PREV VISIT EST AGE 18-39: CPT | Performed by: FAMILY MEDICINE

## 2023-01-16 ASSESSMENT — PATIENT HEALTH QUESTIONNAIRE - PHQ9
SUM OF ALL RESPONSES TO PHQ9 QUESTIONS 1 & 2: 0
1. LITTLE INTEREST OR PLEASURE IN DOING THINGS: 0
2. FEELING DOWN, DEPRESSED OR HOPELESS: 0
SUM OF ALL RESPONSES TO PHQ QUESTIONS 1-9: 0

## 2023-01-16 ASSESSMENT — ENCOUNTER SYMPTOMS
COUGH: 0
SORE THROAT: 0
EYE PAIN: 0
ABDOMINAL PAIN: 0
NAUSEA: 0
TROUBLE SWALLOWING: 0
BACK PAIN: 0
CONSTIPATION: 0
CHEST TIGHTNESS: 0
BLOOD IN STOOL: 0
SHORTNESS OF BREATH: 0

## 2023-01-16 NOTE — PROGRESS NOTES
CARE VISIT    Marcin Teixeira  YOB: 1985    Date of Service:  1/16/2023    Chief Complaint:   Marcin Teixeira is a 42 y.o. male who presents for Comprehensive Annual Evaluation    Patient Active Problem List    Diagnosis Date Noted    Allergic rhinitis 09/17/2010     Priority: Medium    Right kidney stone 08/2020    Pure hypercholesterolemia 08/02/2018      works  at    ups  noted       Hyperlipid   on  the  crestor       Allergies  stable         Kidney  stone    right  hip pain     Preventive Care:  Last eye exam:  staBLE    YEARLY   Exercise: no regular exercise  WILL  WALK   Fracture within the past 6 months: NONE      Living will:    NEEDS    will  noted      Review of Systems   Constitutional:  Negative for fatigue and fever. HENT:  Negative for congestion, ear pain, postnasal drip, sore throat and trouble swallowing. Eyes:  Negative for pain. Respiratory:  Negative for cough, chest tightness and shortness of breath. Cardiovascular:  Negative for chest pain, palpitations and leg swelling. Gastrointestinal:  Negative for abdominal pain, blood in stool, constipation and nausea. Genitourinary:  Negative for difficulty urinating, frequency and urgency. Musculoskeletal:  Negative for arthralgias, back pain, joint swelling and neck stiffness. Skin:  Negative for rash. Neurological:  Negative for dizziness, weakness and headaches. Hematological:  Negative for adenopathy. Does not bruise/bleed easily. Psychiatric/Behavioral:  Negative for behavioral problems, dysphoric mood and sleep disturbance. Allergies   Allergen Reactions    Vicodin [Hydrocodone-Acetaminophen] Itching     Prior to Visit Medications    Medication Sig Taking?  Authorizing Provider   rosuvastatin (CRESTOR) 10 MG tablet TAKE 1 TABLET BY MOUTH NIGHTLY Yes Jewel Puckett MD   ibuprofen (ADVIL;MOTRIN) 200 MG tablet Take 400 mg by mouth every 6 hours as needed for Pain  Yes Historical Provider, MD       Past Medical History:   Diagnosis Date    Hearing loss     Bilateral     Right kidney stone 2020    Varicose vein of scrotum     Left      Past Surgical History:   Procedure Laterality Date    ANTERIOR CRUCIATE LIGAMENT REPAIR Left     TESTICLE SURGERY Left     VASECTOMY      Dr. Palomares      Family History   Problem Relation Age of Onset    High Cholesterol Mother     High Cholesterol Father      Social History     Socioeconomic History    Marital status:      Spouse name: Not on file    Number of children: Not on file    Years of education: Not on file    Highest education level: Not on file   Occupational History    Not on file   Tobacco Use    Smoking status: Former     Packs/day: 0.50     Years: 13.00     Pack years: 6.50     Types: Cigarettes     Quit date: 2013     Years since quittin.5    Smokeless tobacco: Never   Vaping Use    Vaping Use: Never used   Substance and Sexual Activity    Alcohol use: No    Drug use: No     Comment: Weed, Rarely Quit     Sexual activity: Yes     Partners: Female   Other Topics Concern    Not on file   Social History Narrative    Not on file     Social Determinants of Health     Financial Resource Strain: Low Risk     Difficulty of Paying Living Expenses: Not hard at all   Food Insecurity: No Food Insecurity    Worried About Running Out of Food in the Last Year: Never true    Ran Out of Food in the Last Year: Never true   Transportation Needs: Not on file   Physical Activity: Not on file   Stress: Not on file   Social Connections: Not on file   Intimate Partner Violence: Not on file   Housing Stability: Not on file       Wt Readings from Last 3 Encounters:   23 230 lb 3.2 oz (104.4 kg)   10/26/22 217 lb 4 oz (98.5 kg)   22 211 lb 8 oz (95.9 kg)     BP Readings from Last 3 Encounters:   23 130/72   10/26/22 134/78   22 136/84        Vitals:    23 1428   BP: 130/72   Site: Right Upper Arm   Position: Sitting   Cuff Size:  Medium Adult   Pulse: 80   Resp: 14   Weight: 230 lb 3.2 oz (104.4 kg)   Height: 5' 8\" (1.727 m)     Body mass index is 35 kg/m². Physical Exam  Vitals and nursing note reviewed. Constitutional:       Appearance: He is well-developed. HENT:      Head: Normocephalic and atraumatic. Right Ear: External ear normal.      Left Ear: External ear normal.      Nose: Nose normal.   Eyes:      Conjunctiva/sclera: Conjunctivae normal.      Pupils: Pupils are equal, round, and reactive to light. Comments: Fundi nl   Neck:      Thyroid: No thyromegaly. Cardiovascular:      Rate and Rhythm: Normal rate and regular rhythm. Heart sounds: Normal heart sounds. Pulmonary:      Effort: Pulmonary effort is normal.      Breath sounds: Normal breath sounds. No wheezing or rales. Abdominal:      General: Bowel sounds are normal.      Palpations: Abdomen is soft. There is no mass. Tenderness: There is no abdominal tenderness. Musculoskeletal:         General: Normal range of motion. Cervical back: Normal range of motion and neck supple. Lymphadenopathy:      Cervical: No cervical adenopathy. Skin:     General: Skin is warm and dry. Findings: No rash. Neurological:      Mental Status: He is alert and oriented to person, place, and time. Cranial Nerves: No cranial nerve deficit. Deep Tendon Reflexes: Reflexes are normal and symmetric.        Lipid panel:   Lab Results   Component Value Date    CHOL 275 10/26/2022    TRIG 203 10/26/2022    HDL 49 10/26/2022    LDLCALC 188 (A) 10/26/2022     Glucose:   Glucose (mg/dL)   Date Value   08/29/2020 84       Patient Care Team:  Monica Arias MD as PCP - General (Family Medicine)  Monica Arias MD as PCP - [facility] Provider    Immunization History   Administered Date(s) Administered    DTaP vaccine [birth year]    Polio IPV (IPOL) 1985, 1985    Tdap (Boostrix, Adacel) 04/27/2018    Tetanus 10/01/2000 Health Maintenance Due   Topic Date Due    COVID-19 Vaccine (1) Never done    Varicella vaccine (1 of 2 - 2-dose childhood series) Never done    HIV screen  Never done    Hepatitis C screen  Never done    Flu vaccine (1) Never done        Assessment/    ICD-10-CM    1. Wellness examination  Z00.00       2. Pure hypercholesterolemia  E78.00       3. Seasonal allergic rhinitis due to pollen  J30.1       4. Right kidney stone  N20.0            Plan:      Current Outpatient Medications   Medication Sig Dispense Refill    rosuvastatin (CRESTOR) 10 MG tablet TAKE 1 TABLET BY MOUTH NIGHTLY 30 tablet 3    ibuprofen (ADVIL;MOTRIN) 200 MG tablet Take 400 mg by mouth every 6 hours as needed for Pain        No current facility-administered medications for this visit. Get lab    in late   April     No orders of the defined types were placed in this encounter. There are no diagnoses linked to this encounter. No follow-ups on file.

## 2023-02-23 DIAGNOSIS — E78.00 PURE HYPERCHOLESTEROLEMIA: ICD-10-CM

## 2023-02-23 RX ORDER — ROSUVASTATIN CALCIUM 10 MG/1
10 TABLET, COATED ORAL NIGHTLY
Qty: 90 TABLET | Refills: 1 | Status: SHIPPED | OUTPATIENT
Start: 2023-02-23

## 2023-02-23 NOTE — TELEPHONE ENCOUNTER
Date of last visit:  1/16/2023  Date of next visit:  Visit date not found    Requested Prescriptions     Pending Prescriptions Disp Refills    rosuvastatin (CRESTOR) 10 MG tablet [Pharmacy Med Name: ROSUVASTATIN CALCIUM 10 MG TAB] 90 tablet 1     Sig: TAKE 1 TABLET BY MOUTH NIGHTLY

## 2023-02-28 ENCOUNTER — CARE COORDINATION (OUTPATIENT)
Dept: FAMILY MEDICINE CLINIC | Age: 38
End: 2023-02-28

## 2023-03-01 ENCOUNTER — TELEPHONE (OUTPATIENT)
Dept: FAMILY MEDICINE CLINIC | Age: 38
End: 2023-03-01

## 2023-03-01 NOTE — TELEPHONE ENCOUNTER
Pt called stating he is having back/kidney pain today. On a Pain scale 1-5, he is at a 3 per pt. Pt is not having any urinary issues or blood with urination. Pt ask if you have any ideas what this may be or if this is normal after passing stones?     [facility]

## 2023-03-02 NOTE — TELEPHONE ENCOUNTER
Anthony Love informed by Faviola. He stated he is feeling a lot better today.  He will keep his Follow Up appt on 3-7-2023

## 2023-03-02 NOTE — TELEPHONE ENCOUNTER
He was in the hospital  with kidney stone and fell more related to this .   Did he have a cystoscopy or a stent placed as this will cause some discomfort

## 2023-03-03 NOTE — CARE COORDINATION
Care Transitions Initial Follow Up Call    Call within 2 business days of discharge: Yes     Patient: Lotus Delatorre Patient : 1985 MRN: [id number]    Last Discharge  Street       Date Complaint Diagnosis Description Type Department Provider    1/10/22 Pharyngitis Acute pharyngitis, unspecified etiology [facility] (DISCHARGE) [facility]             RARS: No data recorded     Spoke with: Cecilia Bryant    Discharge department/facility: [facility] 2023    Non-face-to-face services provided:  Scheduled appointment with PCP-yes  Scheduled appointment with Specialist-yes  Obtained and reviewed discharge summary and/or continuity of care documents  Reviewed and followed up on pending diagnostic tests and treatments-yes  Communication with specialists who will assume or re-assume care of the patient's system-specific problems-yes  Education of patient/family/caregiver/guardian to support self-management-yes  Assessment and support for treatment adherence and medication management-yes  Establishment or re-establishment of referrals-n/a  Assistance in accessing community resources-n/a    Follow Up  Future Appointments   Date Time Provider Hugo Silvestre   3/7/2023 11:10 AM Erasmo Merrill MD [facility] [employer]   2024  2:00 PM Erasmo Merrill  [address] is glad to be home. He was not happy with the care as they did not even strain his urine and the only way he knew the stones had passed was from the second CT. He is feeling much better now. He has no new medications. He made a follow up appointment and I will see him then.     Guero Abreu RN

## 2023-03-07 ENCOUNTER — CARE COORDINATION (OUTPATIENT)
Dept: FAMILY MEDICINE CLINIC | Age: 38
End: 2023-03-07

## 2023-03-07 ENCOUNTER — OFFICE VISIT (OUTPATIENT)
Dept: FAMILY MEDICINE CLINIC | Age: 38
End: 2023-03-07

## 2023-03-07 VITALS
HEART RATE: 76 BPM | HEIGHT: 68 IN | SYSTOLIC BLOOD PRESSURE: 142 MMHG | RESPIRATION RATE: 14 BRPM | DIASTOLIC BLOOD PRESSURE: 82 MMHG | WEIGHT: 225.25 LBS | BODY MASS INDEX: 34.14 KG/M2

## 2023-03-07 DIAGNOSIS — Z09 HOSPITAL DISCHARGE FOLLOW-UP: ICD-10-CM

## 2023-03-07 DIAGNOSIS — N20.0 RIGHT KIDNEY STONE: Primary | ICD-10-CM

## 2023-03-07 DIAGNOSIS — H69.81 EUSTACHIAN TUBE DYSFUNCTION, RIGHT: ICD-10-CM

## 2023-03-07 DIAGNOSIS — G43.109 MIGRAINE WITH AURA AND WITHOUT STATUS MIGRAINOSUS, NOT INTRACTABLE: ICD-10-CM

## 2023-03-07 RX ORDER — TAMSULOSIN HYDROCHLORIDE 0.4 MG/1
0.4 CAPSULE ORAL DAILY
Qty: 15 CAPSULE | Refills: 0 | Status: SHIPPED | OUTPATIENT
Start: 2023-03-07

## 2023-03-07 RX ORDER — KETOROLAC TROMETHAMINE 10 MG/1
10 TABLET, FILM COATED ORAL EVERY 6 HOURS PRN
Qty: 20 TABLET | Refills: 0 | Status: SHIPPED | OUTPATIENT
Start: 2023-03-07 | End: 2024-03-06

## 2023-03-07 SDOH — ECONOMIC STABILITY: FOOD INSECURITY: WITHIN THE PAST 12 MONTHS, YOU WORRIED THAT YOUR FOOD WOULD RUN OUT BEFORE YOU GOT MONEY TO BUY MORE.: NEVER TRUE

## 2023-03-07 SDOH — ECONOMIC STABILITY: FOOD INSECURITY: WITHIN THE PAST 12 MONTHS, THE FOOD YOU BOUGHT JUST DIDN'T LAST AND YOU DIDN'T HAVE MONEY TO GET MORE.: NEVER TRUE

## 2023-03-07 SDOH — ECONOMIC STABILITY: INCOME INSECURITY: HOW HARD IS IT FOR YOU TO PAY FOR THE VERY BASICS LIKE FOOD, HOUSING, MEDICAL CARE, AND HEATING?: NOT HARD AT ALL

## 2023-03-07 SDOH — ECONOMIC STABILITY: HOUSING INSECURITY
IN THE LAST 12 MONTHS, WAS THERE A TIME WHEN YOU DID NOT HAVE A STEADY PLACE TO SLEEP OR SLEPT IN A SHELTER (INCLUDING NOW)?: NO

## 2023-03-07 ASSESSMENT — ENCOUNTER SYMPTOMS
CHEST TIGHTNESS: 0
NAUSEA: 0
BLOOD IN STOOL: 0
COUGH: 0
CONSTIPATION: 0
TROUBLE SWALLOWING: 0
BACK PAIN: 0
SHORTNESS OF BREATH: 0
ABDOMINAL PAIN: 0
EYE PAIN: 0
SORE THROAT: 0

## 2023-03-07 NOTE — PROGRESS NOTES
Post-Discharge Transitional Care Follow Up      Scott Driscoll   YOB: 1985    Date of Office Visit:  3/7/2023  Date of Hospital Admission: 1/10/22  Date of Hospital Discharge: 1/10/22  Readmission Risk Score (high >=14%. Medium >=10%):No data recorded    Care management risk score Rising risk (score 2-5) and Complex Care (Scores >=6): No Risk Score On File     Non face to face  following discharge, date last encounter closed (first attempt may have been earlier): *No documented post hospital discharge outreach found in the last 14 days     Call initiated 2 business days of discharge: *No response recorded in the last 14 days   IMPRESSION    ICD-10-CM    1. Right kidney stone  N20.0 ketorolac (TORADOL) 10 MG tablet     tamsulosin (FLOMAX) 0.4 MG capsule     Roland Minor MD, Urology, [facility] [address]      2. Eustachian tube dysfunction, right  H69.81       3. Migraine with aura and without status migrainosus, not intractable  G43. 109            PLAN  Current Outpatient Medications   Medication Sig Dispense Refill    ketorolac (TORADOL) 10 MG tablet Take 1 tablet by mouth every 6 hours as needed for Pain From  kidney  stone 20 tablet 0    tamsulosin (FLOMAX) 0.4 MG capsule Take 1 capsule by mouth daily As  needed  for  kidney  stone 15 capsule 0    rosuvastatin (CRESTOR) 10 MG tablet TAKE 1 TABLET BY MOUTH NIGHTLY 90 tablet 1    ibuprofen (ADVIL;MOTRIN) 200 MG tablet Take 400 mg by mouth every 6 hours as needed for Pain        No current facility-administered medications for this visit.       Orders Placed This Encounter   Procedures    Markus Amato MD, Urology, [facility] [address]     Referral Priority:   Routine     Referral Type:   Eval and Treat     Referral Reason:   Specialty Services Required     Referred to Provider:   Cas Azar MD     Requested Specialty:   Urology     Number of Visits Requested:   1        Ubrevly   100 mg  prn headache an  may repeat  in  2  hours      See in   4 months Subjective:   HPI    Inpatient course: Discharge summary reviewed- see chart. Interval history/Current status    right    side  kidney  stone and  better  as  did   pass the  stone      Migraines     Patient Active Problem List   Diagnosis    Pure hypercholesterolemia    Right kidney stone    Allergic rhinitis       Medications listed as ordered at the time of discharge from hospital     Medication List            Accurate as of March 7, 2023 12:41 PM. If you have any questions, ask your nurse or doctor. CONTINUE taking these medications      ibuprofen 200 MG tablet  Commonly known as: ADVIL;MOTRIN     rosuvastatin 10 MG tablet  Commonly known as: CRESTOR  TAKE 1 TABLET BY MOUTH NIGHTLY               Medications marked \"taking\" at this time  Outpatient Medications Marked as Taking for the 3/7/23 encounter (Office Visit) with Rene Romano MD   Medication Sig Dispense Refill    rosuvastatin (CRESTOR) 10 MG tablet TAKE 1 TABLET BY MOUTH NIGHTLY 90 tablet 1    ibuprofen (ADVIL;MOTRIN) 200 MG tablet Take 400 mg by mouth every 6 hours as needed for Pain           Medications patient taking as of now reconciled against medications ordered at time of hospital discharge: Yes    Review of Systems   Constitutional:  Negative for fatigue and fever. HENT:  Negative for congestion, ear pain, postnasal drip, sore throat and trouble swallowing. Eyes:  Negative for pain. Respiratory:  Negative for cough, chest tightness and shortness of breath. Cardiovascular:  Negative for chest pain, palpitations and leg swelling. Gastrointestinal:  Negative for abdominal pain, blood in stool, constipation and nausea. Genitourinary:  Negative for difficulty urinating, frequency and urgency. Musculoskeletal:  Negative for arthralgias, back pain, joint swelling and neck stiffness. Skin:  Negative for rash. Neurological:  Negative for dizziness, weakness and headaches.    Hematological:  Negative for adenopathy. Does not bruise/bleed easily. Psychiatric/Behavioral:  Negative for behavioral problems, dysphoric mood and sleep disturbance. Objective:    BP (!) 142/82 (Site: Right Upper Arm, Position: Sitting, Cuff Size: Medium Adult)   Pulse 76   Resp 14   Ht 5' 8\" (1.727 m)   Wt 225 lb 4 oz (102.2 kg)   BMI 34.25 kg/m²   Physical Exam  Vitals and nursing note reviewed. Constitutional:       Appearance: He is well-developed. HENT:      Head: Normocephalic and atraumatic. Right Ear: External ear normal.      Left Ear: External ear normal.      Nose: Nose normal.   Eyes:      Conjunctiva/sclera: Conjunctivae normal.      Pupils: Pupils are equal, round, and reactive to light. Comments: Fundi nl   Neck:      Thyroid: No thyromegaly. Cardiovascular:      Rate and Rhythm: Normal rate and regular rhythm. Heart sounds: Normal heart sounds. Pulmonary:      Effort: Pulmonary effort is normal.      Breath sounds: Normal breath sounds. No wheezing or rales. Abdominal:      General: Bowel sounds are normal.      Palpations: Abdomen is soft. There is no mass. Tenderness: There is no abdominal tenderness. Musculoskeletal:         General: Normal range of motion. Cervical back: Normal range of motion and neck supple. Lymphadenopathy:      Cervical: No cervical adenopathy. Skin:     General: Skin is warm and dry. Findings: No rash. Neurological:      Mental Status: He is alert and oriented to person, place, and time. Cranial Nerves: No cranial nerve deficit. Deep Tendon Reflexes: Reflexes are normal and symmetric. An electronic signature was used to authenticate this note.   --Crow Galloway MD

## 2023-03-07 NOTE — CARE COORDINATION
Timothy Simpson is feeling much better. He feels like his is back to his baseline. He has no home needs or questions but will call if he does. I will follow up with him next week. This is the first time he has had Kidney stones but he has a family history of other family members that have had kidney stones.

## 2023-03-14 ENCOUNTER — CARE COORDINATION (OUTPATIENT)
Dept: FAMILY MEDICINE CLINIC | Age: 38
End: 2023-03-14

## 2023-03-14 NOTE — CARE COORDINATION
Timothy Simpson is doing well. He has no home needs and no questions at this time. He is feeling good. I will follow up with him next week.

## 2023-03-22 ENCOUNTER — CARE COORDINATION (OUTPATIENT)
Dept: FAMILY MEDICINE CLINIC | Age: 38
End: 2023-03-22

## 2023-03-22 NOTE — CARE COORDINATION
Wilver Rodas is doing fine. He has no further symptoms and he feels good. He has no home needs. I will follow up with him next week.

## 2023-03-29 ENCOUNTER — CARE COORDINATION (OUTPATIENT)
Dept: FAMILY MEDICINE CLINIC | Age: 38
End: 2023-03-29

## 2023-03-29 NOTE — CARE COORDINATION
Tessie Brown is doing well. He has had no further kidney stones,pain or difficulty voiding. He is good as he was prior to this episode. He has no home needs and stated he will definitely call if he needs anything or has any questions.

## 2023-04-19 ENCOUNTER — OFFICE VISIT (OUTPATIENT)
Dept: UROLOGY | Age: 38
End: 2023-04-19
Payer: COMMERCIAL

## 2023-04-19 VITALS — RESPIRATION RATE: 14 BRPM | HEIGHT: 68 IN | WEIGHT: 230.8 LBS | BODY MASS INDEX: 34.98 KG/M2

## 2023-04-19 DIAGNOSIS — N20.0 KIDNEY STONES: Primary | ICD-10-CM

## 2023-04-19 PROCEDURE — 99204 OFFICE O/P NEW MOD 45 MIN: CPT | Performed by: UROLOGY

## 2023-04-19 NOTE — PROGRESS NOTES
Dr. Beth Hooper MD  [facility])  Urology Clinic  New Patient Visit      Patient:  Moni Simpson  YOB: 1985  Date: 4/19/2023    HISTORY OF PRESENT ILLNESS:   The patient is a 34 y.o. male who presents today for evaluation of the following problem(s): history of kidney stones x 2. Mothers side of the family has passed kidney stones. Still has a 2mm right stone in-situ. Serum Ca 8.8. Overall the problem(s) : show no change. Associated Symptoms: No dysuria, gross hematuria. Pain Severity: 0/10    Summary of old records:   2x stones    Imaging/Labs reviewed during today's visit:  I have independently reviewed and verified the following films during today's visit. KUB Feb 2023. Last several PSA's:  No results found for: PSA    Last total testosterone:  No results found for: TESTOSTERONE    Urinalysis today:  No results found for this visit on 04/19/23.       Last BUN and creatinine:  Lab Results   Component Value Date    BUN 15 02/27/2023     Lab Results   Component Value Date    CREATININE 1.14 02/27/2023       Imaging Reviewed during this Office Visit:   (results were independently reviewed by physician and radiology report verified)    PAST MEDICAL, FAMILY AND SOCIAL HISTORY:  Past Medical History:   Diagnosis Date    Hearing loss     Bilateral     Right kidney stone 02/2023    in 2020 on right    Varicose vein of scrotum     Left      Past Surgical History:   Procedure Laterality Date    ANTERIOR CRUCIATE LIGAMENT REPAIR Left     TESTICLE SURGERY Left 1990    VASECTOMY  1-2012    Dr. Regina Solorzano      Family History   Problem Relation Age of Onset    High Cholesterol Mother     High Cholesterol Father      Outpatient Medications Marked as Taking for the 4/19/23 encounter (Office Visit) with Beth Hooper MD   Medication Sig Dispense Refill    rosuvastatin (CRESTOR) 10 MG tablet TAKE 1 TABLET BY MOUTH NIGHTLY 90 tablet 1       Vicodin [hydrocodone-acetaminophen]  Social History     Tobacco

## 2023-05-09 NOTE — TELEPHONE ENCOUNTER
Pt called in and he would like to know if he could get a script of ubervly sent to [facility] on [address]. Pt said he had a sample from Dr Fatou Cox and he said that really helped with the migraine. Pt called back and he is requesting 100 mg ubrevly.

## 2023-05-09 NOTE — TELEPHONE ENCOUNTER
Date of last visit:  3/7/2023  Date of next visit:  7/7/2023    Requested Prescriptions     Pending Prescriptions Disp Refills    Ubrogepant (UBRELVY) 100 MG TABS 16 tablet 1     Sig: Take 1 tablet by mouth daily as needed (Migraine)

## 2023-05-10 RX ORDER — UBROGEPANT 100 MG/1
1 TABLET ORAL DAILY PRN
Qty: 16 TABLET | Refills: 1 | Status: SHIPPED | OUTPATIENT
Start: 2023-05-10

## 2023-06-16 ENCOUNTER — HOSPITAL ENCOUNTER (EMERGENCY)
Age: 38
Discharge: HOME | End: 2023-06-16
Payer: COMMERCIAL

## 2023-06-16 VITALS
BODY MASS INDEX: 33.34 KG/M2 | DIASTOLIC BLOOD PRESSURE: 75 MMHG | WEIGHT: 220 LBS | HEART RATE: 63 BPM | TEMPERATURE: 98 F | RESPIRATION RATE: 16 BRPM | OXYGEN SATURATION: 97 % | HEIGHT: 68 IN | SYSTOLIC BLOOD PRESSURE: 153 MMHG

## 2023-06-16 DIAGNOSIS — K62.9 ANAL LESION: Primary | ICD-10-CM

## 2023-06-16 PROCEDURE — 99283 EMERGENCY DEPT VISIT LOW MDM: CPT

## 2023-06-16 RX ORDER — HYDROCORTISONE 25 MG/G
CREAM TOPICAL
Qty: 28 G | Refills: 0 | Status: SHIPPED | OUTPATIENT
Start: 2023-06-16

## 2023-06-16 ASSESSMENT — ENCOUNTER SYMPTOMS
EYE PAIN: 0
BACK PAIN: 0
RECTAL PAIN: 1
ABDOMINAL PAIN: 0
SHORTNESS OF BREATH: 0
SINUS PRESSURE: 0
SORE THROAT: 0
DIARRHEA: 0
VOMITING: 0
COUGH: 0
ANAL BLEEDING: 1
RHINORRHEA: 0
NAUSEA: 0
EYE ITCHING: 0

## 2023-06-16 NOTE — ED TRIAGE NOTES
Patient to room 35 from triage for complaint of some rectal bleeding that started yesterday at work. Patient states that he is concerned that a hemorrhoid may have ruptured as he knows that he had one. Patient states that he went home yesterday and went to bed and woke up today with no bleeding. He then went to work where the bleeding resumed. No further complaints voiced.

## 2023-06-16 NOTE — ED PROVIDER NOTES
Exam  Constitutional:       General: He is not in acute distress. Appearance: Normal appearance. He is normal weight. He is not ill-appearing. HENT:      Head: Normocephalic. Genitourinary:     Rectum: Mass present. Comments: Dried blood present on outside of anal opening. Erythematous, firm lesion with white area as noted around 2:00 position on anal opening. Picture as below. Musculoskeletal:         General: No swelling or deformity. Skin:     General: Skin is warm. Coloration: Skin is not pale. Neurological:      General: No focal deficit present. Mental Status: He is alert and oriented to person, place, and time. Mental status is at baseline. Psychiatric:         Mood and Affect: Mood normal.         Behavior: Behavior normal.         Thought Content: Thought content normal.         Judgment: Judgment normal.           DIFFERENTIAL DIAGNOSIS:   DDX: Anal fissure, external hemorrhoid, internal hemorrohoid, rectal mass  Less likely: Trauma, genital warts    DIAGNOSTIC RESULTS     EKG: All EKG's are interpreted by the Emergency Department Physician who either signs or Co-signs this chart in the absence of a cardiologist.      RADIOLOGY: non-plain film images(s) such as CT, Ultrasound and MRI are read by the radiologist.  No orders to display         LABS:   Labs Reviewed - No data to display    EMERGENCY DEPARTMENT COURSE:   :    Vitals:    06/16/23 0907   BP: (!) 153/75   Pulse: 63   Resp: 16   Temp: 98 °F (36.7 °C)   TempSrc: Oral   SpO2: 97%   Weight: 220 lb (99.8 kg)   Height: 5' 8\" (1.727 m)     Patient was seen history physical exam was performed. See disposition below    CRITICAL CARE:  None    CONSULTS:  None    PROCEDURES:  None    FINAL IMPRESSION      1.  Anal lesion          DISPOSITION/PLAN   Discharge    PATIENT REFERRED TO:  [facility] Esc[facility] [id number]  [phone]    In 1 week        DISCHARGE MEDICATIONS:  Discharge Medication List

## 2023-07-18 ENCOUNTER — OFFICE VISIT (OUTPATIENT)
Dept: FAMILY MEDICINE CLINIC | Age: 38
End: 2023-07-18

## 2023-07-18 VITALS
SYSTOLIC BLOOD PRESSURE: 134 MMHG | WEIGHT: 214.4 LBS | HEIGHT: 68 IN | BODY MASS INDEX: 32.49 KG/M2 | HEART RATE: 74 BPM | DIASTOLIC BLOOD PRESSURE: 74 MMHG | RESPIRATION RATE: 14 BRPM

## 2023-07-18 DIAGNOSIS — J30.1 SEASONAL ALLERGIC RHINITIS DUE TO POLLEN: ICD-10-CM

## 2023-07-18 DIAGNOSIS — J45.20 MILD INTERMITTENT REACTIVE AIRWAY DISEASE WITHOUT COMPLICATION: Primary | ICD-10-CM

## 2023-07-18 PROCEDURE — 99213 OFFICE O/P EST LOW 20 MIN: CPT | Performed by: FAMILY MEDICINE

## 2023-07-18 ASSESSMENT — ENCOUNTER SYMPTOMS
COUGH: 0
RHINORRHEA: 0
ABDOMINAL PAIN: 0
NAUSEA: 0
SORE THROAT: 0
SHORTNESS OF BREATH: 0
BACK PAIN: 0
DIFFICULTY BREATHING: 1
CHEST TIGHTNESS: 0
CONSTIPATION: 0
BLOOD IN STOOL: 0
TROUBLE SWALLOWING: 0
EYE PAIN: 0

## 2023-07-31 ENCOUNTER — OFFICE VISIT (OUTPATIENT)
Dept: FAMILY MEDICINE CLINIC | Age: 38
End: 2023-07-31

## 2023-07-31 VITALS
WEIGHT: 218 LBS | HEART RATE: 72 BPM | HEIGHT: 68 IN | DIASTOLIC BLOOD PRESSURE: 70 MMHG | SYSTOLIC BLOOD PRESSURE: 134 MMHG | BODY MASS INDEX: 33.04 KG/M2 | RESPIRATION RATE: 14 BRPM

## 2023-07-31 DIAGNOSIS — J30.1 SEASONAL ALLERGIC RHINITIS DUE TO POLLEN: ICD-10-CM

## 2023-07-31 DIAGNOSIS — B00.9 HERPES INFECTION: Primary | ICD-10-CM

## 2023-07-31 DIAGNOSIS — J45.20 MILD INTERMITTENT ASTHMA WITHOUT COMPLICATION: ICD-10-CM

## 2023-07-31 PROCEDURE — 99213 OFFICE O/P EST LOW 20 MIN: CPT | Performed by: FAMILY MEDICINE

## 2023-07-31 RX ORDER — VALACYCLOVIR HYDROCHLORIDE 1 G/1
1000 TABLET, FILM COATED ORAL DAILY
Qty: 30 TABLET | Refills: 0 | Status: SHIPPED | OUTPATIENT
Start: 2023-07-31 | End: 2023-08-30

## 2023-07-31 RX ORDER — ALBUTEROL SULFATE 90 UG/1
2 AEROSOL, METERED RESPIRATORY (INHALATION) EVERY 4 HOURS PRN
Qty: 18 G | Refills: 0 | Status: SHIPPED | OUTPATIENT
Start: 2023-07-31

## 2023-07-31 ASSESSMENT — ENCOUNTER SYMPTOMS
COUGH: 0
NAUSEA: 0
BLOOD IN STOOL: 0
TROUBLE SWALLOWING: 0
SHORTNESS OF BREATH: 0
EYE PAIN: 0
SORE THROAT: 0
CONSTIPATION: 0
WHEEZING: 1
ABDOMINAL PAIN: 0
BACK PAIN: 0
CHEST TIGHTNESS: 0

## 2023-08-02 DIAGNOSIS — E78.00 PURE HYPERCHOLESTEROLEMIA: ICD-10-CM

## 2023-08-02 NOTE — TELEPHONE ENCOUNTER
The pharmacy is  requesting a refill of the below medication which has been pended for you:     Requested Prescriptions     Pending Prescriptions Disp Refills    rosuvastatin (CRESTOR) 10 MG tablet [Pharmacy Med Name: ROSUVASTATIN CALCIUM 10 MG TAB] 90 tablet 1     Sig: TAKE 1 TABLET BY MOUTH NIGHTLY       Last Appointment Date: 7/31/2023  Next Appointment Date: 1/17/2024    Allergies   Allergen Reactions    Vicodin [Hydrocodone-Acetaminophen] Itching

## 2023-08-03 RX ORDER — ROSUVASTATIN CALCIUM 10 MG/1
10 TABLET, COATED ORAL NIGHTLY
Qty: 90 TABLET | Refills: 1 | Status: SHIPPED | OUTPATIENT
Start: 2023-08-03

## 2023-08-22 DIAGNOSIS — B00.9 HERPES INFECTION: ICD-10-CM

## 2023-08-22 RX ORDER — VALACYCLOVIR HYDROCHLORIDE 1 G/1
TABLET, FILM COATED ORAL
Qty: 30 TABLET | Refills: 1 | Status: SHIPPED | OUTPATIENT
Start: 2023-08-22

## 2023-08-22 NOTE — TELEPHONE ENCOUNTER
Date of last visit:  7/31/2023  Date of next visit:  1/17/2024    Requested Prescriptions     Pending Prescriptions Disp Refills    valACYclovir (VALTREX) 1 g tablet [Pharmacy Med Name: VALACYCLOVIR HCL 1 GRAM TABLET] 30 tablet 0     Sig: TAKE 1 TABLET BY MOUTH EVERY DAY

## 2023-09-13 ENCOUNTER — TELEPHONE (OUTPATIENT)
Dept: FAMILY MEDICINE CLINIC | Age: 38
End: 2023-09-13

## 2023-09-13 RX ORDER — AZITHROMYCIN 250 MG/1
TABLET, FILM COATED ORAL
Qty: 1 PACKET | Refills: 0 | Status: SHIPPED | OUTPATIENT
Start: 2023-09-13 | End: 2023-09-27 | Stop reason: ALTCHOICE

## 2023-09-13 NOTE — TELEPHONE ENCOUNTER
Pt called saying antibiotics have helped him feel better, his fever is gone but he is still having troubles breathing along with coughing. Wants to know if dr can send something to the pharmacy to help him out.     Qamar JOHNSON

## 2023-09-13 NOTE — TELEPHONE ENCOUNTER
Date of last visit:  7/31/2023  Date of next visit:  1/17/2024    Requested Prescriptions     Signed Prescriptions Disp Refills    azithromycin (ZITHROMAX) 250 MG tablet 1 packet 0     Sig: Take 2 tabs (500 mg) on Day 1, and take 1 tab (250 mg) on days 2 through 5.      Authorizing Provider: Nancy Olsen     Ordering User: Amy Choudhury

## 2023-09-15 ENCOUNTER — TELEPHONE (OUTPATIENT)
Dept: FAMILY MEDICINE CLINIC | Age: 38
End: 2023-09-15

## 2023-09-15 RX ORDER — CEPHALEXIN 500 MG/1
500 CAPSULE ORAL 3 TIMES DAILY
Qty: 21 CAPSULE | Refills: 0 | Status: SHIPPED | OUTPATIENT
Start: 2023-09-15 | End: 2023-09-22

## 2023-09-15 NOTE — TELEPHONE ENCOUNTER
Pt informed per Dr Lisa Brown to stop the Zpac and Keflex was called into the pharmacy to replace Abisai Peguero.

## 2023-09-15 NOTE — TELEPHONE ENCOUNTER
MOM to return call to office     Per Dr. Patricia Simmons Cephalexin 500 MG 1 Tab TID 21 Capsules for 7 days was ordered and sent to pharmacy.

## 2023-09-15 NOTE — TELEPHONE ENCOUNTER
Pt called stating he took his ATB Zpac dose this morning and ate an orange. Pt informed me he broke out into hives for about 3 hours after and the hives vanished. Pt ask if you feel may be a Zpac reaction or the combination of acidic fruit with Zpac? Pt will take his ATB tomorrow without eating the orange and see if any reaction.

## 2023-09-27 ENCOUNTER — OFFICE VISIT (OUTPATIENT)
Dept: FAMILY MEDICINE CLINIC | Age: 38
End: 2023-09-27

## 2023-09-27 VITALS
BODY MASS INDEX: 33.53 KG/M2 | SYSTOLIC BLOOD PRESSURE: 122 MMHG | DIASTOLIC BLOOD PRESSURE: 66 MMHG | HEIGHT: 68 IN | HEART RATE: 80 BPM | WEIGHT: 221.25 LBS | RESPIRATION RATE: 12 BRPM

## 2023-09-27 DIAGNOSIS — J20.9 ACUTE BRONCHITIS WITH BRONCHOSPASM: Primary | ICD-10-CM

## 2023-09-27 DIAGNOSIS — B00.9 HERPES INFECTION: ICD-10-CM

## 2023-09-27 PROCEDURE — 99213 OFFICE O/P EST LOW 20 MIN: CPT | Performed by: FAMILY MEDICINE

## 2023-09-27 RX ORDER — VALACYCLOVIR HYDROCHLORIDE 1 G/1
TABLET, FILM COATED ORAL
Qty: 30 TABLET | Refills: 1 | Status: SHIPPED | OUTPATIENT
Start: 2023-09-27

## 2023-09-27 RX ORDER — FLUTICASONE FUROATE, UMECLIDINIUM BROMIDE AND VILANTEROL TRIFENATATE 200; 62.5; 25 UG/1; UG/1; UG/1
1 POWDER RESPIRATORY (INHALATION) DAILY
Qty: 1 EACH | Refills: 0
Start: 2023-09-27

## 2023-09-27 RX ORDER — AMOXICILLIN AND CLAVULANATE POTASSIUM 875; 125 MG/1; MG/1
1 TABLET, FILM COATED ORAL 2 TIMES DAILY
Qty: 20 TABLET | Refills: 0 | Status: SHIPPED | OUTPATIENT
Start: 2023-09-27 | End: 2023-10-07

## 2023-09-27 RX ORDER — PREDNISONE 20 MG/1
TABLET ORAL
Qty: 12 TABLET | Refills: 0 | Status: SHIPPED | OUTPATIENT
Start: 2023-09-27

## 2023-09-27 ASSESSMENT — ENCOUNTER SYMPTOMS
TROUBLE SWALLOWING: 0
COUGH: 0
SHORTNESS OF BREATH: 0
BACK PAIN: 0
CONSTIPATION: 0
BLOOD IN STOOL: 0
ABDOMINAL PAIN: 0
SORE THROAT: 0
EYE PAIN: 0
CHEST TIGHTNESS: 0
NAUSEA: 0

## 2023-09-27 NOTE — PROGRESS NOTES
Appearance: He is well-developed. HENT:      Head: Normocephalic and atraumatic. Right Ear: External ear normal.      Left Ear: External ear normal.      Nose: Congestion present. Mouth/Throat:      Pharynx: Posterior oropharyngeal erythema present. Eyes:      Conjunctiva/sclera: Conjunctivae normal.      Pupils: Pupils are equal, round, and reactive to light. Comments: Fundi nl   Neck:      Thyroid: No thyromegaly. Cardiovascular:      Rate and Rhythm: Normal rate and regular rhythm. Heart sounds: Normal heart sounds. Pulmonary:      Effort: Pulmonary effort is normal.      Breath sounds: Rhonchi present. No wheezing or rales. Comments:    Some  rhonci and  wheeze  with expiration  Abdominal:      General: Bowel sounds are normal.      Palpations: Abdomen is soft. There is no mass. Tenderness: There is no abdominal tenderness. Musculoskeletal:         General: Normal range of motion. Cervical back: Normal range of motion and neck supple. Lymphadenopathy:      Cervical: No cervical adenopathy. Skin:     General: Skin is warm and dry. Findings: No rash. Neurological:      Mental Status: He is alert and oriented to person, place, and time. Cranial Nerves: No cranial nerve deficit. Deep Tendon Reflexes: Reflexes are normal and symmetric. Assessment:        ICD-10-CM    1.  Acute bronchitis with bronchospasm  J20.9 XR CHEST (2 VW)     fluticasone-umeclidin-vilant (TRELEGY ELLIPTA) 200-62.5-25 MCG/ACT AEPB inhaler     amoxicillin-clavulanate (AUGMENTIN) 875-125 MG per tablet     predniSONE (DELTASONE) 20 MG tablet             Plan:      Current Outpatient Medications   Medication Sig Dispense Refill    fluticasone-umeclidin-vilant (TRELEGY ELLIPTA) 200-62.5-25 MCG/ACT AEPB inhaler Inhale 1 puff into the lungs daily 1 each 0    amoxicillin-clavulanate (AUGMENTIN) 875-125 MG per tablet Take 1 tablet by mouth 2 times daily for 10 days 20 tablet 0

## 2023-09-27 NOTE — TELEPHONE ENCOUNTER
Date of last visit:  7/31/2023  Date of next visit:  1/17/2024    Requested Prescriptions     Pending Prescriptions Disp Refills    valACYclovir (VALTREX) 1 g tablet [Pharmacy Med Name: VALACYCLOVIR HCL 1 GRAM TABLET] 30 tablet 1     Sig: TAKE 1 TABLET BY MOUTH EVERY DAY

## 2023-09-28 ENCOUNTER — HOSPITAL ENCOUNTER (OUTPATIENT)
Dept: GENERAL RADIOLOGY | Age: 38
Discharge: HOME | End: 2023-09-28
Payer: COMMERCIAL

## 2023-09-28 ENCOUNTER — HOSPITAL ENCOUNTER (OUTPATIENT)
Age: 38
Discharge: HOME | End: 2023-09-28
Payer: COMMERCIAL

## 2023-09-28 DIAGNOSIS — J20.9 ACUTE BRONCHITIS WITH BRONCHOSPASM: ICD-10-CM

## 2023-09-28 PROCEDURE — 71046 X-RAY EXAM CHEST 2 VIEWS: CPT

## 2023-09-29 ENCOUNTER — TELEPHONE (OUTPATIENT)
Dept: FAMILY MEDICINE CLINIC | Age: 38
End: 2023-09-29

## 2023-10-11 ENCOUNTER — OFFICE VISIT (OUTPATIENT)
Dept: FAMILY MEDICINE CLINIC | Age: 38
End: 2023-10-11

## 2023-10-11 VITALS
SYSTOLIC BLOOD PRESSURE: 138 MMHG | HEIGHT: 68 IN | HEART RATE: 76 BPM | DIASTOLIC BLOOD PRESSURE: 84 MMHG | WEIGHT: 223 LBS | BODY MASS INDEX: 33.8 KG/M2 | RESPIRATION RATE: 16 BRPM

## 2023-10-11 DIAGNOSIS — K64.1 BLEEDING GRADE II HEMORRHOIDS: Primary | ICD-10-CM

## 2023-10-11 RX ORDER — HYDROCORTISONE ACETATE 25 MG/1
25 SUPPOSITORY RECTAL EVERY 12 HOURS
Qty: 12 SUPPOSITORY | Refills: 1 | Status: SHIPPED | OUTPATIENT
Start: 2023-10-11

## 2023-10-11 ASSESSMENT — ENCOUNTER SYMPTOMS
CONSTIPATION: 0
TROUBLE SWALLOWING: 0
COUGH: 0
CHEST TIGHTNESS: 0
EYE PAIN: 0
SORE THROAT: 0
SHORTNESS OF BREATH: 0
BLOOD IN STOOL: 0
CHANGE IN BOWEL HABIT: 0
NAUSEA: 0
ABDOMINAL PAIN: 0
BACK PAIN: 0

## 2023-10-11 NOTE — PROGRESS NOTES
into the lungs every 4 hours as needed for Wheezing 18 g 0    hydrocortisone (ANUSOL-HC) 2.5 % CREA rectal cream Apply to area BID 28 g 0    Ubrogepant (UBRELVY) 100 MG TABS Take 1 tablet by mouth daily as needed (Migraine) 16 tablet 1    ibuprofen (ADVIL;MOTRIN) 200 MG tablet Take 2 tablets by mouth every 6 hours as needed for Pain       No current facility-administered medications for this visit.       Orders Placed This Encounter   Procedures    John Renee MD, GastroenterologyKade     Referral Priority:   Routine     Referral Type:   Eval and Treat     Referral Reason:   Specialty Services Required     Referred to Provider:   Mitzi Chan MD     Requested Specialty:   Gastroenterology     Number of Visits Requested:   1      Sitz bath      Tucks  anusol   suppository and  cream and    feel  with  size and  also in ER  in June    needs  probable  banding         Rogerio Degroot MD

## 2024-01-11 RX ORDER — UBROGEPANT 100 MG/1
1 TABLET ORAL DAILY PRN
Qty: 16 TABLET | Refills: 1 | Status: SHIPPED | OUTPATIENT
Start: 2024-01-11

## 2024-01-11 NOTE — TELEPHONE ENCOUNTER
Date of last visit:  10/11/2023  Date of next visit:  Visit date not found    Requested Prescriptions     Pending Prescriptions Disp Refills    Ubrogepant (UBRELVY) 100 MG TABS 16 tablet 1     Sig: Take 1 tablet by mouth daily as needed (Migraine)

## 2024-04-09 ENCOUNTER — TELEPHONE (OUTPATIENT)
Dept: FAMILY MEDICINE CLINIC | Age: 39
End: 2024-04-09

## 2024-04-09 DIAGNOSIS — E78.00 PURE HYPERCHOLESTEROLEMIA: ICD-10-CM

## 2024-04-09 NOTE — TELEPHONE ENCOUNTER
Patient left message with answering service stating that his BP with a wrist cuff is 178/95 and he is having vision issues.

## 2024-04-09 NOTE — TELEPHONE ENCOUNTER
Ghanshyam called requesting a refill of the below medication which has been pended for you:     Requested Prescriptions     Pending Prescriptions Disp Refills    rosuvastatin (CRESTOR) 10 MG tablet 90 tablet 1     Sig: Take 1 tablet by mouth nightly    Ubrogepant (UBRELVY) 100 MG TABS 16 tablet 1     Sig: Take 1 tablet by mouth daily as needed (Migraine)       Last Appointment Date: 10/11/2023  Next Appointment Date: Visit date not found    Allergies   Allergen Reactions    Vicodin [Hydrocodone-Acetaminophen] Itching

## 2024-04-09 NOTE — TELEPHONE ENCOUNTER
Pt called back stating he went to [facility] ER this morning and they stated his BP and everything was fine.    Pt is asking if any of his meds would cause him to have blurry vision?    He is thinking possibly his statin or his Ubrelvy?    Pt just had an Optometrist appt in February but made another appt for this week again due to having more frequent migraines also.    Please call pt once addressed

## 2024-04-09 NOTE — TELEPHONE ENCOUNTER
Spoke with patient and advised him that if he is having other symptoms like the vision issue with the high BP he should go to ER for evaluation

## 2024-04-10 ENCOUNTER — OFFICE VISIT (OUTPATIENT)
Dept: FAMILY MEDICINE CLINIC | Age: 39
End: 2024-04-10

## 2024-04-10 VITALS
RESPIRATION RATE: 16 BRPM | HEART RATE: 64 BPM | SYSTOLIC BLOOD PRESSURE: 126 MMHG | HEIGHT: 68 IN | WEIGHT: 226.25 LBS | BODY MASS INDEX: 34.29 KG/M2 | DIASTOLIC BLOOD PRESSURE: 78 MMHG

## 2024-04-10 DIAGNOSIS — E78.00 PURE HYPERCHOLESTEROLEMIA: ICD-10-CM

## 2024-04-10 DIAGNOSIS — J45.20 MILD INTERMITTENT ASTHMA WITHOUT COMPLICATION: ICD-10-CM

## 2024-04-10 DIAGNOSIS — H53.9 VISUAL DISTURBANCES: ICD-10-CM

## 2024-04-10 DIAGNOSIS — G43.109 MIGRAINE WITH AURA AND WITHOUT STATUS MIGRAINOSUS, NOT INTRACTABLE: Primary | ICD-10-CM

## 2024-04-10 RX ORDER — ALBUTEROL SULFATE 90 UG/1
2 AEROSOL, METERED RESPIRATORY (INHALATION) EVERY 4 HOURS PRN
Qty: 18 G | Refills: 0 | Status: SHIPPED | OUTPATIENT
Start: 2024-04-10

## 2024-04-10 RX ORDER — ROSUVASTATIN CALCIUM 10 MG/1
10 TABLET, COATED ORAL NIGHTLY
Qty: 90 TABLET | Refills: 1 | Status: SHIPPED | OUTPATIENT
Start: 2024-04-10

## 2024-04-10 RX ORDER — UBROGEPANT 100 MG/1
1 TABLET ORAL DAILY PRN
Qty: 16 TABLET | Refills: 1 | Status: SHIPPED | OUTPATIENT
Start: 2024-04-10 | End: 2024-04-10 | Stop reason: SDUPTHER

## 2024-04-10 RX ORDER — ROSUVASTATIN CALCIUM 10 MG/1
10 TABLET, COATED ORAL NIGHTLY
Qty: 90 TABLET | Refills: 1 | Status: SHIPPED | OUTPATIENT
Start: 2024-04-10 | End: 2024-04-10 | Stop reason: SDUPTHER

## 2024-04-10 RX ORDER — UBROGEPANT 100 MG/1
1 TABLET ORAL DAILY PRN
Qty: 16 TABLET | Refills: 1 | Status: SHIPPED | OUTPATIENT
Start: 2024-04-10

## 2024-04-10 SDOH — ECONOMIC STABILITY: FOOD INSECURITY: WITHIN THE PAST 12 MONTHS, YOU WORRIED THAT YOUR FOOD WOULD RUN OUT BEFORE YOU GOT MONEY TO BUY MORE.: NEVER TRUE

## 2024-04-10 SDOH — ECONOMIC STABILITY: FOOD INSECURITY: WITHIN THE PAST 12 MONTHS, THE FOOD YOU BOUGHT JUST DIDN'T LAST AND YOU DIDN'T HAVE MONEY TO GET MORE.: NEVER TRUE

## 2024-04-10 SDOH — ECONOMIC STABILITY: INCOME INSECURITY: HOW HARD IS IT FOR YOU TO PAY FOR THE VERY BASICS LIKE FOOD, HOUSING, MEDICAL CARE, AND HEATING?: NOT HARD AT ALL

## 2024-04-10 ASSESSMENT — PATIENT HEALTH QUESTIONNAIRE - PHQ9
1. LITTLE INTEREST OR PLEASURE IN DOING THINGS: NOT AT ALL
SUM OF ALL RESPONSES TO PHQ QUESTIONS 1-9: 0
SUM OF ALL RESPONSES TO PHQ9 QUESTIONS 1 & 2: 0
2. FEELING DOWN, DEPRESSED OR HOPELESS: NOT AT ALL

## 2024-04-10 ASSESSMENT — ENCOUNTER SYMPTOMS
NAUSEA: 0
SORE THROAT: 0
SHORTNESS OF BREATH: 0
CHEST TIGHTNESS: 0
TROUBLE SWALLOWING: 0
BLOOD IN STOOL: 0
BACK PAIN: 0
EYE PAIN: 0
ABDOMINAL PAIN: 0
COUGH: 0

## 2024-04-10 NOTE — PROGRESS NOTES
Subjective:      Patient ID: Ghanshyam Verduzco is a 39 y.o. male.      Vision  now   off and    focus  off and    blurry and   new  glasses   noted    now    vision      Labs and    ct  of  head   on  4-9-24      Classic  migraine on  4-7-24   ubrelvy  2 a  month        Migraine   This is a chronic problem. The current episode started more than 1 year ago. The problem occurs daily. The problem has been unchanged. The pain does not radiate. The quality of the pain is described as aching. Pertinent negatives include no abdominal pain, back pain, coughing, dizziness, ear pain, eye pain, fever, nausea, sore throat or weakness. Associated symptoms comments: vision   symptoms  different   from  aura  of  the  migraine . The treatment provided significant relief.     Past Medical History:   Diagnosis Date    Hearing loss     Bilateral     Right kidney stone 02/2023    in 2020 on right    Varicose vein of scrotum     Left       Review of Systems   Constitutional:  Negative for fatigue and fever.   HENT:  Negative for congestion, ear pain, postnasal drip, sore throat and trouble swallowing.    Eyes:  Negative for pain.   Respiratory:  Negative for cough, chest tightness and shortness of breath.    Cardiovascular:  Negative for chest pain, palpitations and leg swelling.   Gastrointestinal:  Negative for abdominal pain, blood in stool, constipation and nausea.   Genitourinary:  Negative for difficulty urinating, frequency and urgency.   Musculoskeletal:  Negative for arthralgias, back pain, joint swelling and neck stiffness.   Skin:  Negative for rash.   Neurological:  Negative for dizziness, weakness and headaches.   Hematological:  Negative for adenopathy. Does not bruise/bleed easily.   Psychiatric/Behavioral:  Negative for behavioral problems, dysphoric mood and sleep disturbance.      /78 (Site: Right Upper Arm, Position: Sitting, Cuff Size: Medium Adult)   Pulse 64   Resp 16   Ht 1.727 m (5' 8\")   Wt 102.6 kg (226 lb

## 2024-04-10 NOTE — TELEPHONE ENCOUNTER
Patient informed and stated he is almost out of the Ubrelvy because he's been having increased migraines.  Appt scheduled for today.

## 2024-04-10 NOTE — TELEPHONE ENCOUNTER
Doubt  any  meds would do and ubrelvy prn and if took for headache realize headache cause blurred vision      Crestor been on

## 2024-09-23 ENCOUNTER — OFFICE VISIT (OUTPATIENT)
Dept: FAMILY MEDICINE CLINIC | Age: 39
End: 2024-09-23

## 2024-09-23 VITALS
HEART RATE: 68 BPM | DIASTOLIC BLOOD PRESSURE: 84 MMHG | SYSTOLIC BLOOD PRESSURE: 130 MMHG | BODY MASS INDEX: 34.93 KG/M2 | HEIGHT: 68 IN | RESPIRATION RATE: 16 BRPM | WEIGHT: 230.5 LBS

## 2024-09-23 DIAGNOSIS — J45.20 MILD INTERMITTENT ASTHMA WITHOUT COMPLICATION: ICD-10-CM

## 2024-09-23 DIAGNOSIS — E78.00 PURE HYPERCHOLESTEROLEMIA: ICD-10-CM

## 2024-09-23 DIAGNOSIS — J30.1 SEASONAL ALLERGIC RHINITIS DUE TO POLLEN: ICD-10-CM

## 2024-09-23 DIAGNOSIS — Z00.00 WELL ADULT EXAM: Primary | ICD-10-CM

## 2024-09-23 DIAGNOSIS — Z23 INFLUENZA VACCINE NEEDED: ICD-10-CM

## 2024-09-23 DIAGNOSIS — G43.109 MIGRAINE WITH AURA AND WITHOUT STATUS MIGRAINOSUS, NOT INTRACTABLE: ICD-10-CM

## 2024-09-23 LAB
A/G RATIO: NORMAL
ALBUMIN: 4.6 G/DL
ALP BLD-CCNC: 99 U/L
ALT SERPL-CCNC: 25 U/L
AST SERPL-CCNC: 18 U/L
BILIRUB SERPL-MCNC: 0.7 MG/DL (ref 0.1–1.4)
BILIRUBIN DIRECT: 0.15 MG/DL
BILIRUBIN, INDIRECT: NORMAL
CHOLESTEROL, TOTAL: 265 MG/DL
CHOLESTEROL/HDL RATIO: NORMAL
GLOBULIN: NORMAL
HDLC SERPL-MCNC: 53 MG/DL (ref 35–70)
LDL CHOLESTEROL: 188
NONHDLC SERPL-MCNC: NORMAL MG/DL
TOTAL PROTEIN: 7.2 G/DL (ref 6.4–8.2)
TRIGL SERPL-MCNC: 130 MG/DL
TSH SERPL DL<=0.05 MIU/L-ACNC: 1.4 UIU/ML
VLDLC SERPL CALC-MCNC: 24 MG/DL

## 2024-09-23 PROCEDURE — 90471 IMMUNIZATION ADMIN: CPT | Performed by: FAMILY MEDICINE

## 2024-09-23 PROCEDURE — 99396 PREV VISIT EST AGE 40-64: CPT | Performed by: FAMILY MEDICINE

## 2024-09-23 PROCEDURE — 90658 IIV3 VACCINE SPLT 0.5 ML IM: CPT | Performed by: FAMILY MEDICINE

## 2024-09-23 RX ORDER — INFLUENZA A VIRUS A/VICTORIA/4897/2022 IVR-238 (H1N1) ANTIGEN (FORMALDEHYDE INACTIVATED), INFLUENZA A VIRUS A/CALIFORNIA/122/2022 SAN-022 (H3N2) ANTIGEN (FORMALDEHYDE INACTIVATED), AND INFLUENZA B VIRUS B/MICHIGAN/01/2021 ANTIGEN (FORMALDEHYDE INACTIVATED) 15; 15; 15 MG/.5ML; MG/.5ML; MG/.5ML
0.5 INJECTION, SUSPENSION INTRAMUSCULAR ONCE
Qty: 0.5 ML | Refills: 0 | Status: SHIPPED | OUTPATIENT
Start: 2024-09-23 | End: 2024-09-23 | Stop reason: CLARIF

## 2024-09-23 ASSESSMENT — ENCOUNTER SYMPTOMS
TROUBLE SWALLOWING: 0
NAUSEA: 0
BACK PAIN: 0
COUGH: 0
EYE PAIN: 0
CONSTIPATION: 0
ABDOMINAL PAIN: 0
SHORTNESS OF BREATH: 0
SORE THROAT: 0
CHEST TIGHTNESS: 0
BLOOD IN STOOL: 0

## 2024-09-24 DIAGNOSIS — J45.20 MILD INTERMITTENT ASTHMA WITHOUT COMPLICATION: ICD-10-CM

## 2024-09-24 DIAGNOSIS — E78.00 PURE HYPERCHOLESTEROLEMIA: ICD-10-CM

## 2024-10-02 ENCOUNTER — TELEPHONE (OUTPATIENT)
Dept: FAMILY MEDICINE CLINIC | Age: 39
End: 2024-10-02

## 2024-10-02 DIAGNOSIS — J30.1 SEASONAL ALLERGIC RHINITIS DUE TO POLLEN: Primary | ICD-10-CM

## 2024-10-02 DIAGNOSIS — E78.00 PURE HYPERCHOLESTEROLEMIA: ICD-10-CM

## 2024-10-02 DIAGNOSIS — J45.20 MILD INTERMITTENT ASTHMA WITHOUT COMPLICATION: ICD-10-CM

## 2024-10-02 NOTE — TELEPHONE ENCOUNTER
Pt called to req the lab results from blood work he had done    Please call pt once addressed [phone]    Pt also stated he was to get an pill to open his airways     [place] send to [facility]

## 2024-10-04 RX ORDER — ROSUVASTATIN CALCIUM 10 MG/1
10 TABLET, COATED ORAL NIGHTLY
Qty: 90 TABLET | Refills: 1 | Status: SHIPPED | OUTPATIENT
Start: 2024-10-04

## 2024-10-04 RX ORDER — MONTELUKAST SODIUM 10 MG/1
10 TABLET ORAL DAILY
Qty: 30 TABLET | Refills: 3 | Status: SHIPPED | OUTPATIENT
Start: 2024-10-04

## 2024-10-04 NOTE — TELEPHONE ENCOUNTER
Saima has got labs at [facility]    Liver functions were normal but cholesterol at 265 and the LDL or bad cholesterol is 188    As reported was not taking the Crestor and really needs to resume and we can repeat labs after the first of the year if he is willing to resume Crestor    Does he need the medication  called in ?    Please call     Will send in      Singular 10 mg   as sent  to pharmacy

## 2024-10-04 NOTE — TELEPHONE ENCOUNTER
Ghanshyam informed by Phone. He was ok with resuming Crestor. He will repeat his labs after the first of the year. Please order labs.   Lab: [facility] on [address]     Date of last visit:  9/23/2024  Date of next visit:  Visit date not found    Requested Prescriptions     Signed Prescriptions Disp Refills    montelukast (SINGULAIR) 10 MG tablet 30 tablet 3     Sig: Take 1 tablet by mouth daily     Authorizing Provider: NICOLE LEVIN    rosuvastatin (CRESTOR) 10 MG tablet 90 tablet 1     Sig: Take 1 tablet by mouth nightly     Authorizing Provider: NICOLE LEVIN     Ordering User: ALETA RONDON

## 2024-11-04 ENCOUNTER — TELEPHONE (OUTPATIENT)
Dept: FAMILY MEDICINE CLINIC | Age: 39
End: 2024-11-04

## 2024-11-04 NOTE — TELEPHONE ENCOUNTER
Date of last visit:  9/23/2024  Date of next visit:  Visit date not found    Requested Prescriptions     Signed Prescriptions Disp Refills    amoxicillin-clavulanate (AUGMENTIN) 875-125 MG per tablet 20 tablet 0     Sig: Take 1 tablet by mouth 2 times daily for 10 days     Authorizing Provider: NICOLE LEVIN     Ordering User: ALETA RONDON

## 2024-11-04 NOTE — TELEPHONE ENCOUNTER
Patient called C/O productive greenish cough.  Did have a fever for about 3 days.  Feels it is bronchitis and  would like an RX to [facility]    Please call patient

## 2024-11-05 ENCOUNTER — HOSPITAL ENCOUNTER (OUTPATIENT)
Dept: GENERAL RADIOLOGY | Age: 39
Discharge: HOME | End: 2024-11-05
Payer: COMMERCIAL

## 2024-11-05 ENCOUNTER — HOSPITAL ENCOUNTER (OUTPATIENT)
Age: 39
Discharge: HOME | End: 2024-11-05
Payer: COMMERCIAL

## 2024-11-05 ENCOUNTER — OFFICE VISIT (OUTPATIENT)
Dept: FAMILY MEDICINE CLINIC | Age: 39
End: 2024-11-05

## 2024-11-05 VITALS
SYSTOLIC BLOOD PRESSURE: 132 MMHG | WEIGHT: 232 LBS | DIASTOLIC BLOOD PRESSURE: 76 MMHG | BODY MASS INDEX: 35.16 KG/M2 | HEART RATE: 84 BPM | RESPIRATION RATE: 18 BRPM | HEIGHT: 68 IN

## 2024-11-05 DIAGNOSIS — J20.9 ACUTE BRONCHITIS WITH BRONCHOSPASM: ICD-10-CM

## 2024-11-05 DIAGNOSIS — J20.9 ACUTE BRONCHITIS WITH BRONCHOSPASM: Primary | ICD-10-CM

## 2024-11-05 PROCEDURE — 99213 OFFICE O/P EST LOW 20 MIN: CPT | Performed by: FAMILY MEDICINE

## 2024-11-05 PROCEDURE — 71046 X-RAY EXAM CHEST 2 VIEWS: CPT

## 2024-11-05 RX ORDER — BENZONATATE 200 MG/1
200 CAPSULE ORAL 3 TIMES DAILY PRN
Qty: 30 CAPSULE | Refills: 0 | Status: SHIPPED | OUTPATIENT
Start: 2024-11-05 | End: 2024-11-05 | Stop reason: SDUPTHER

## 2024-11-05 RX ORDER — BENZONATATE 200 MG/1
200 CAPSULE ORAL 3 TIMES DAILY PRN
Qty: 30 CAPSULE | Refills: 0 | Status: SHIPPED | OUTPATIENT
Start: 2024-11-05 | End: 2024-11-15

## 2024-11-05 RX ORDER — FLUTICASONE FUROATE, UMECLIDINIUM BROMIDE AND VILANTEROL TRIFENATATE 200; 62.5; 25 UG/1; UG/1; UG/1
1 POWDER RESPIRATORY (INHALATION) DAILY
Qty: 1 EACH | Refills: 0
Start: 2024-11-05 | End: 2024-11-05 | Stop reason: SDUPTHER

## 2024-11-05 RX ORDER — FLUTICASONE FUROATE, UMECLIDINIUM BROMIDE AND VILANTEROL TRIFENATATE 200; 62.5; 25 UG/1; UG/1; UG/1
1 POWDER RESPIRATORY (INHALATION) DAILY
Qty: 1 EACH | Refills: 0 | Status: SHIPPED | OUTPATIENT
Start: 2024-11-05

## 2024-11-05 ASSESSMENT — PATIENT HEALTH QUESTIONNAIRE - PHQ9
2. FEELING DOWN, DEPRESSED OR HOPELESS: NOT AT ALL
1. LITTLE INTEREST OR PLEASURE IN DOING THINGS: NOT AT ALL
SUM OF ALL RESPONSES TO PHQ QUESTIONS 1-9: 0
SUM OF ALL RESPONSES TO PHQ9 QUESTIONS 1 & 2: 0
SUM OF ALL RESPONSES TO PHQ QUESTIONS 1-9: 0

## 2024-11-05 ASSESSMENT — ENCOUNTER SYMPTOMS
BLOOD IN STOOL: 0
ABDOMINAL PAIN: 0
NAUSEA: 0
TROUBLE SWALLOWING: 0
SINUS PRESSURE: 1
EYE PAIN: 0
COUGH: 1
SHORTNESS OF BREATH: 0
CONSTIPATION: 0
CHEST TIGHTNESS: 0
BACK PAIN: 0
SORE THROAT: 0
HOARSE VOICE: 1

## 2024-11-05 NOTE — PROGRESS NOTES
Appearance: He is well-developed.   HENT:      Head: Normocephalic and atraumatic.      Right Ear: External ear normal.      Left Ear: External ear normal.      Nose: Congestion present.      Mouth/Throat:      Pharynx: Posterior oropharyngeal erythema present.   Eyes:      Conjunctiva/sclera: Conjunctivae normal.      Pupils: Pupils are equal, round, and reactive to light.      Comments: Fundi nl   Neck:      Thyroid: No thyromegaly.   Cardiovascular:      Rate and Rhythm: Normal rate and regular rhythm.      Heart sounds: Normal heart sounds.   Pulmonary:      Effort: Pulmonary effort is normal.      Breath sounds: Normal breath sounds. No wheezing or rales.      Comments:      Wheeze  anteriorly and  with  few  rales   Abdominal:      General: Bowel sounds are normal.      Palpations: Abdomen is soft. There is no mass.      Tenderness: There is no abdominal tenderness.   Musculoskeletal:         General: Normal range of motion.      Cervical back: Normal range of motion and neck supple.   Lymphadenopathy:      Cervical: No cervical adenopathy.   Skin:     General: Skin is warm and dry.      Findings: No rash.   Neurological:      Mental Status: He is alert and oriented to person, place, and time.      Cranial Nerves: No cranial nerve deficit.      Deep Tendon Reflexes: Reflexes are normal and symmetric.            Assessment     ICD-10-CM    1. Acute bronchitis with bronchospasm  J20.9 XR CHEST (2 VW)     amoxicillin-clavulanate (AUGMENTIN) 875-125 MG per tablet     benzonatate (TESSALON) 200 MG capsule     fluticasone-umeclidin-vilant (TRELEGY ELLIPTA) 200-62.5-25 MCG/ACT AEPB inhaler             Plan   Current Outpatient Medications   Medication Sig Dispense Refill    amoxicillin-clavulanate (AUGMENTIN) 875-125 MG per tablet Take 1 tablet by mouth 2 times daily for 10 days 20 tablet 0    benzonatate (TESSALON) 200 MG capsule Take 1 capsule by mouth 3 times daily as needed for Cough 30 capsule 0

## 2024-11-06 ENCOUNTER — TELEPHONE (OUTPATIENT)
Dept: FAMILY MEDICINE CLINIC | Age: 39
End: 2024-11-06

## 2024-11-06 NOTE — TELEPHONE ENCOUNTER
----- Message from Dr. Lionel Barclay MD sent at 11/6/2024 12:24 AM EST -----  Call as cxr with no pneumonia . Acute bronchitis with wheeze

## 2024-12-03 DIAGNOSIS — J20.9 ACUTE BRONCHITIS WITH BRONCHOSPASM: ICD-10-CM

## 2024-12-03 NOTE — TELEPHONE ENCOUNTER
Date of last visit:  11/5/2024  Date of next visit:  Visit date not found    Requested Prescriptions     Pending Prescriptions Disp Refills    fluticasone-umeclidin-vilant (TRELEGY ELLIPTA) 200-62.5-25 MCG/ACT AEPB inhaler [Pharmacy Med Name: TRELEGY ELLIPTA 200-62.5MCG INH 30P] 60 each 3     Sig: INHALE 1 PUFF INTO THE LUNGS DAILY FOR 14 DAYS

## 2024-12-04 RX ORDER — FLUTICASONE FUROATE, UMECLIDINIUM BROMIDE AND VILANTEROL TRIFENATATE 200; 62.5; 25 UG/1; UG/1; UG/1
POWDER RESPIRATORY (INHALATION)
Qty: 60 EACH | Refills: 3 | Status: SHIPPED | OUTPATIENT
Start: 2024-12-04

## 2025-02-18 ENCOUNTER — OFFICE VISIT (OUTPATIENT)
Dept: FAMILY MEDICINE CLINIC | Age: 40
End: 2025-02-18

## 2025-02-18 VITALS
WEIGHT: 235 LBS | HEART RATE: 68 BPM | SYSTOLIC BLOOD PRESSURE: 120 MMHG | BODY MASS INDEX: 35.61 KG/M2 | RESPIRATION RATE: 16 BRPM | HEIGHT: 68 IN | DIASTOLIC BLOOD PRESSURE: 60 MMHG

## 2025-02-18 DIAGNOSIS — J45.20 MILD INTERMITTENT ASTHMA WITHOUT COMPLICATION: ICD-10-CM

## 2025-02-18 DIAGNOSIS — R07.89 ATYPICAL CHEST PAIN: ICD-10-CM

## 2025-02-18 DIAGNOSIS — R06.02 SHORTNESS OF BREATH: Primary | ICD-10-CM

## 2025-02-18 DIAGNOSIS — E78.00 PURE HYPERCHOLESTEROLEMIA: ICD-10-CM

## 2025-02-18 DIAGNOSIS — J30.1 SEASONAL ALLERGIC RHINITIS DUE TO POLLEN: ICD-10-CM

## 2025-02-18 PROCEDURE — 99214 OFFICE O/P EST MOD 30 MIN: CPT | Performed by: FAMILY MEDICINE

## 2025-02-18 PROCEDURE — 93000 ELECTROCARDIOGRAM COMPLETE: CPT | Performed by: FAMILY MEDICINE

## 2025-02-18 RX ORDER — ROSUVASTATIN CALCIUM 10 MG/1
10 TABLET, COATED ORAL NIGHTLY
Qty: 90 TABLET | Refills: 1 | Status: SHIPPED | OUTPATIENT
Start: 2025-02-18

## 2025-02-18 RX ORDER — ROSUVASTATIN CALCIUM 10 MG/1
10 TABLET, COATED ORAL NIGHTLY
Qty: 90 TABLET | Refills: 1 | Status: CANCELLED | OUTPATIENT
Start: 2025-02-18

## 2025-02-18 RX ORDER — MONTELUKAST SODIUM 10 MG/1
10 TABLET ORAL DAILY
Qty: 30 TABLET | Refills: 3 | Status: SHIPPED | OUTPATIENT
Start: 2025-02-18

## 2025-02-18 RX ORDER — ALBUTEROL SULFATE 90 UG/1
2 INHALANT RESPIRATORY (INHALATION) EVERY 4 HOURS PRN
Qty: 18 G | Refills: 2 | Status: SHIPPED | OUTPATIENT
Start: 2025-02-18

## 2025-02-18 SDOH — ECONOMIC STABILITY: FOOD INSECURITY: WITHIN THE PAST 12 MONTHS, YOU WORRIED THAT YOUR FOOD WOULD RUN OUT BEFORE YOU GOT MONEY TO BUY MORE.: NEVER TRUE

## 2025-02-18 SDOH — ECONOMIC STABILITY: FOOD INSECURITY: WITHIN THE PAST 12 MONTHS, THE FOOD YOU BOUGHT JUST DIDN'T LAST AND YOU DIDN'T HAVE MONEY TO GET MORE.: NEVER TRUE

## 2025-02-18 ASSESSMENT — ENCOUNTER SYMPTOMS
CHEST TIGHTNESS: 0
DIFFICULTY BREATHING: 1
COUGH: 0
NAUSEA: 0
SHORTNESS OF BREATH: 1
ABDOMINAL PAIN: 0
SORE THROAT: 0
CONSTIPATION: 0
TROUBLE SWALLOWING: 0
BLOOD IN STOOL: 0
BACK PAIN: 0
FREQUENT THROAT CLEARING: 0
EYE PAIN: 0

## 2025-02-18 ASSESSMENT — PATIENT HEALTH QUESTIONNAIRE - PHQ9
SUM OF ALL RESPONSES TO PHQ QUESTIONS 1-9: 0
2. FEELING DOWN, DEPRESSED OR HOPELESS: NOT AT ALL
SUM OF ALL RESPONSES TO PHQ QUESTIONS 1-9: 0
1. LITTLE INTEREST OR PLEASURE IN DOING THINGS: NOT AT ALL
SUM OF ALL RESPONSES TO PHQ9 QUESTIONS 1 & 2: 0
SUM OF ALL RESPONSES TO PHQ QUESTIONS 1-9: 0
SUM OF ALL RESPONSES TO PHQ QUESTIONS 1-9: 0

## 2025-02-18 NOTE — TELEPHONE ENCOUNTER
Date of last visit:  11/5/2024  Date of next visit:  Visit date not found    Requested Prescriptions     Pending Prescriptions Disp Refills    montelukast (SINGULAIR) 10 MG tablet [Pharmacy Med Name: MONTELUKAST 10MG TABLETS] 30 tablet 3     Sig: TAKE 1 TABLET BY MOUTH DAILY

## 2025-02-18 NOTE — PROGRESS NOTES
Subjective   Patient ID: Ghanshyam Verduzco is a 40 y.o. male.      Stopped  age  30  smoking       Dyspnea  with  activity and  vague  chest   sympooms               Asthma  He complains of difficulty breathing and shortness of breath. There is no chest tightness, cough or frequent throat clearing. The problem has been resolved. Pertinent negatives include no chest pain, ear pain, fever, headaches, postnasal drip, sore throat or trouble swallowing. His past medical history is significant for asthma.     Past Medical History:   Diagnosis Date    Hearing loss     Bilateral     Right kidney stone 2023    in  on right    Varicose vein of scrotum     Left      Past Surgical History:   Procedure Laterality Date    ANTERIOR CRUCIATE LIGAMENT REPAIR Left     TESTICLE SURGERY Left     VASECTOMY      Dr. Palomares      Social History     Socioeconomic History    Marital status:      Spouse name: Not on file    Number of children: Not on file    Years of education: Not on file    Highest education level: Not on file   Occupational History    Not on file   Tobacco Use    Smoking status: Former     Current packs/day: 0.00     Average packs/day: 0.5 packs/day for 13.0 years (6.5 ttl pk-yrs)     Types: Cigarettes     Start date: 2000     Quit date: 2013     Years since quittin.6     Passive exposure: Never    Smokeless tobacco: Never   Vaping Use    Vaping status: Never Used   Substance and Sexual Activity    Alcohol use: No    Drug use: No     Comment: Weed, Rarely Quit     Sexual activity: Yes     Partners: Female   Other Topics Concern    Not on file   Social History Narrative    Not on file     Social Determinants of Health     Financial Resource Strain: Low Risk  (4/10/2024)    Overall Financial Resource Strain (CARDIA)     Difficulty of Paying Living Expenses: Not hard at all   Food Insecurity: No Food Insecurity (2025)    Hunger Vital Sign     Worried About Running Out of Food

## 2025-02-18 NOTE — TELEPHONE ENCOUNTER
Pt called req ref rosuvastatin (CRESTOR) 10 MG tablet  Take 1 tablet by mouth nightly 90 qty refs 1    Rehana haskinsy

## 2025-02-18 NOTE — PROGRESS NOTES
Pt sched for PFT 3/4/25 at 5 pm pt needs to be there at 4.45 [address] [place] [address] heart center  may eat a light meal day of no rescue inhaler 4-6 hr before but take to appt ,do not wear any restrictive clothing no alcohol or smoking day before .    Pt advised and stated verbal understanding

## 2025-02-18 NOTE — TELEPHONE ENCOUNTER
Date of last visit:  11/5/2024  Date of next visit:  2/18/2025    Requested Prescriptions     Pending Prescriptions Disp Refills    rosuvastatin (CRESTOR) 10 MG tablet 90 tablet 1     Sig: Take 1 tablet by mouth nightly

## 2025-02-20 ENCOUNTER — TELEPHONE (OUTPATIENT)
Dept: FAMILY MEDICINE CLINIC | Age: 40
End: 2025-02-20

## 2025-02-20 NOTE — TELEPHONE ENCOUNTER
----- Message from Dr. iLonel Barclay MD sent at 2/20/2025  2:11 AM EST -----  Call as cxr was  normal

## 2025-03-04 ENCOUNTER — HOSPITAL ENCOUNTER (OUTPATIENT)
Dept: PULMONOLOGY | Age: 40
Discharge: HOME | End: 2025-03-04
Attending: FAMILY MEDICINE
Payer: COMMERCIAL

## 2025-03-04 DIAGNOSIS — J45.20 MILD INTERMITTENT ASTHMA WITHOUT COMPLICATION: ICD-10-CM

## 2025-03-04 DIAGNOSIS — R06.02 SHORTNESS OF BREATH: ICD-10-CM

## 2025-03-04 PROCEDURE — 94729 DIFFUSING CAPACITY: CPT

## 2025-03-04 PROCEDURE — 94726 PLETHYSMOGRAPHY LUNG VOLUMES: CPT

## 2025-03-04 PROCEDURE — 94060 EVALUATION OF WHEEZING: CPT

## 2025-03-06 ENCOUNTER — TELEPHONE (OUTPATIENT)
Dept: FAMILY MEDICINE CLINIC | Age: 40
End: 2025-03-06

## 2025-03-06 NOTE — TELEPHONE ENCOUNTER
----- Message from Dr. Lionel Barclay MD sent at 3/6/2025  5:58 AM EST -----  Call as  PFT  are  normal which is good and still feel asthma  symptoms      Use  of  inhaler  still     Call and keep appt

## 2025-03-17 ENCOUNTER — OFFICE VISIT (OUTPATIENT)
Dept: FAMILY MEDICINE CLINIC | Age: 40
End: 2025-03-17

## 2025-03-17 VITALS
BODY MASS INDEX: 36.13 KG/M2 | HEART RATE: 60 BPM | DIASTOLIC BLOOD PRESSURE: 84 MMHG | WEIGHT: 238.38 LBS | RESPIRATION RATE: 16 BRPM | HEIGHT: 68 IN | SYSTOLIC BLOOD PRESSURE: 130 MMHG

## 2025-03-17 DIAGNOSIS — E78.00 PURE HYPERCHOLESTEROLEMIA: ICD-10-CM

## 2025-03-17 DIAGNOSIS — J45.20 MILD INTERMITTENT ASTHMA WITHOUT COMPLICATION: Primary | ICD-10-CM

## 2025-03-17 DIAGNOSIS — G43.109 MIGRAINE WITH AURA AND WITHOUT STATUS MIGRAINOSUS, NOT INTRACTABLE: ICD-10-CM

## 2025-03-17 DIAGNOSIS — J30.1 SEASONAL ALLERGIC RHINITIS DUE TO POLLEN: ICD-10-CM

## 2025-03-17 PROCEDURE — 99213 OFFICE O/P EST LOW 20 MIN: CPT | Performed by: FAMILY MEDICINE

## 2025-03-17 RX ORDER — FLUTICASONE FUROATE AND VILANTEROL 100; 25 UG/1; UG/1
1 POWDER RESPIRATORY (INHALATION) DAILY
Qty: 1 EACH | Refills: 3 | Status: SHIPPED | OUTPATIENT
Start: 2025-03-17

## 2025-03-17 ASSESSMENT — ENCOUNTER SYMPTOMS
EYE PAIN: 0
SHORTNESS OF BREATH: 1
NAUSEA: 0
CONSTIPATION: 0
BLOOD IN STOOL: 0
COUGH: 0
TROUBLE SWALLOWING: 0
CHEST TIGHTNESS: 0
BACK PAIN: 0
SORE THROAT: 0
ABDOMINAL PAIN: 0

## 2025-03-17 NOTE — PROGRESS NOTES
Subjective   Patient ID: Ghanshyam Verduzco is a 40 y.o. male.    Asthma  He complains of shortness of breath. There is no chest tightness or cough. Pertinent negatives include no chest pain, ear pain, fever, headaches, postnasal drip, sore throat or trouble swallowing. His past medical history is significant for asthma.     Past Medical History:   Diagnosis Date    Hearing loss     Bilateral     Right kidney stone 02/2023    in 2020 on right    Varicose vein of scrotum     Left       Review of Systems   Constitutional:  Negative for fatigue and fever.   HENT:  Negative for congestion, ear pain, postnasal drip, sore throat and trouble swallowing.    Eyes:  Negative for pain.   Respiratory:  Positive for shortness of breath. Negative for cough and chest tightness.    Cardiovascular:  Negative for chest pain, palpitations and leg swelling.   Gastrointestinal:  Negative for abdominal pain, blood in stool, constipation and nausea.   Genitourinary:  Negative for difficulty urinating, frequency and urgency.   Musculoskeletal:  Negative for arthralgias, back pain, joint swelling and neck stiffness.   Skin:  Negative for rash.   Neurological:  Negative for dizziness, weakness and headaches.   Hematological:  Negative for adenopathy. Does not bruise/bleed easily.   Psychiatric/Behavioral:  Negative for behavioral problems, dysphoric mood and sleep disturbance.         /84 (BP Site: Right Upper Arm, Patient Position: Sitting, BP Cuff Size: Large Adult)   Pulse 60   Resp 16   Ht 1.727 m (5' 8\")   Wt 108.1 kg (238 lb 6 oz)   BMI 36.24 kg/m²    Objective   Physical Exam  Vitals and nursing note reviewed.   Constitutional:       Appearance: He is well-developed.   HENT:      Head: Normocephalic and atraumatic.      Right Ear: External ear normal.      Left Ear: External ear normal.      Nose: Nose normal.   Eyes:      Conjunctiva/sclera: Conjunctivae normal.      Pupils: Pupils are equal, round, and reactive to light.

## 2025-05-07 DIAGNOSIS — E78.00 PURE HYPERCHOLESTEROLEMIA: ICD-10-CM

## 2025-05-07 RX ORDER — ROSUVASTATIN CALCIUM 10 MG/1
10 TABLET, COATED ORAL NIGHTLY
Qty: 90 TABLET | Refills: 1 | Status: SHIPPED | OUTPATIENT
Start: 2025-05-07

## 2025-06-20 ENCOUNTER — TELEPHONE (OUTPATIENT)
Dept: FAMILY MEDICINE CLINIC | Age: 40
End: 2025-06-20

## 2025-06-20 RX ORDER — KETOCONAZOLE 20 MG/G
CREAM TOPICAL
Qty: 30 G | Refills: 1 | Status: SHIPPED | OUTPATIENT
Start: 2025-06-20

## 2025-06-20 RX ORDER — TERBINAFINE HYDROCHLORIDE 250 MG/1
250 TABLET ORAL DAILY
Qty: 14 TABLET | Refills: 0 | Status: SHIPPED | OUTPATIENT
Start: 2025-06-20 | End: 2025-07-04

## 2025-06-20 NOTE — TELEPHONE ENCOUNTER
Patient called stating he has jock itch.He stated he has been using otc cream but it's not helping.He also stated he is having some swelling he is asking for an medication to help.    Patient uses [facility]    Patient was asked if he wanted to come in he stated he didn't want to come in unless doctor want's him to.

## 2025-06-20 NOTE — TELEPHONE ENCOUNTER
Date of last visit:  3/17/2025  Date of next visit:  9/29/2025    Requested Prescriptions     Signed Prescriptions Disp Refills    ketoconazole (NIZORAL) 2 % cream 30 g 1     Sig: Apply topically daily.     Authorizing Provider: NICOLE LEVIN     Ordering User: ALETA RONDON    terbinafine (LAMISIL) 250 MG tablet 14 tablet 0     Sig: Take 1 tablet by mouth daily for 14 days     Authorizing Provider: NICOLE LEVIN     Ordering User: ALETA RONDON informed by Phone.

## 2025-06-24 ENCOUNTER — OFFICE VISIT (OUTPATIENT)
Dept: FAMILY MEDICINE CLINIC | Age: 40
End: 2025-06-24

## 2025-06-24 VITALS
DIASTOLIC BLOOD PRESSURE: 78 MMHG | HEIGHT: 68 IN | HEART RATE: 72 BPM | WEIGHT: 243.38 LBS | SYSTOLIC BLOOD PRESSURE: 134 MMHG | RESPIRATION RATE: 18 BRPM | BODY MASS INDEX: 36.89 KG/M2

## 2025-06-24 DIAGNOSIS — R35.0 URINE FREQUENCY: ICD-10-CM

## 2025-06-24 DIAGNOSIS — L23.7 ALLERGIC CONTACT DERMATITIS DUE TO PLANTS, EXCEPT FOOD: Primary | ICD-10-CM

## 2025-06-24 LAB
BACTERIA URINE, POC: NORMAL
BILIRUBIN URINE: 0 MG/DL
BLOOD, URINE: NEGATIVE
CASTS URINE, POC: NORMAL
CLARITY, UA: CLEAR
COLOR, UA: YELLOW
CRYSTALS URINE, POC: NORMAL
EPI CELLS URINE, POC: NORMAL
GLUCOSE URINE: NEGATIVE
KETONES, URINE: NEGATIVE
LEUKOCYTE EST, POC: NEGATIVE
NITRITE, URINE: NEGATIVE
PH UA: 6.5 (ref 4.5–8)
PROTEIN UA: NEGATIVE
RBC URINE, POC: NORMAL
SPECIFIC GRAVITY UA: 1.01 (ref 1–1.03)
UROBILINOGEN, URINE: NORMAL
WBC URINE, POC: NORMAL
YEAST URINE, POC: NORMAL

## 2025-06-24 PROCEDURE — 99213 OFFICE O/P EST LOW 20 MIN: CPT | Performed by: FAMILY MEDICINE

## 2025-06-24 PROCEDURE — 81000 URINALYSIS NONAUTO W/SCOPE: CPT | Performed by: FAMILY MEDICINE

## 2025-06-24 RX ORDER — PREDNISONE 20 MG/1
TABLET ORAL
Qty: 21 TABLET | Refills: 0 | Status: SHIPPED | OUTPATIENT
Start: 2025-06-24

## 2025-06-24 RX ORDER — HYDROXYZINE HYDROCHLORIDE 25 MG/1
25 TABLET, FILM COATED ORAL EVERY 6 HOURS PRN
Qty: 30 TABLET | Refills: 0 | Status: SHIPPED | OUTPATIENT
Start: 2025-06-24 | End: 2025-07-04

## 2025-06-24 ASSESSMENT — ENCOUNTER SYMPTOMS
TROUBLE SWALLOWING: 0
CONSTIPATION: 0
EYE PAIN: 0
ABDOMINAL PAIN: 0
SORE THROAT: 0
SHORTNESS OF BREATH: 0
BACK PAIN: 0
COUGH: 0
NAUSEA: 0
CHEST TIGHTNESS: 0
BLOOD IN STOOL: 0

## 2025-06-24 NOTE — PROGRESS NOTES
Subjective   Patient ID: Ghanshyam Verduzco is a 40 y.o. male.        Rash  in  the   groin     Benign Prostatic Hypertrophy  This is a new problem. Episode onset: last  week. The problem has been gradually worsening since onset. Irritative symptoms include frequency. Irritative symptoms do not include urgency. Obstructive symptoms include an intermittent stream and a slower stream. Pertinent negatives include no nausea. Nothing aggravates the symptoms.     Past Medical History:   Diagnosis Date    Hearing loss     Bilateral     Right kidney stone 02/2023    in 2020 on right    Varicose vein of scrotum     Left         Review of Systems   Constitutional:  Negative for fatigue and fever.   HENT:  Negative for congestion, ear pain, postnasal drip, sore throat and trouble swallowing.    Eyes:  Negative for pain.   Respiratory:  Negative for cough, chest tightness and shortness of breath.    Cardiovascular:  Negative for chest pain, palpitations and leg swelling.   Gastrointestinal:  Negative for abdominal pain, blood in stool, constipation and nausea.   Genitourinary:  Positive for decreased urine volume and frequency. Negative for difficulty urinating and urgency.   Musculoskeletal:  Negative for arthralgias, back pain, joint swelling and neck stiffness.   Skin:  Negative for rash.   Neurological:  Negative for dizziness, weakness and headaches.   Hematological:  Negative for adenopathy. Does not bruise/bleed easily.   Psychiatric/Behavioral:  Negative for behavioral problems, dysphoric mood and sleep disturbance.         /78 (BP Site: Right Upper Arm, Patient Position: Sitting, BP Cuff Size: Medium Adult)   Pulse 72   Resp 18   Ht 1.727 m (5' 8\")   Wt 110.4 kg (243 lb 6 oz)   BMI 37.01 kg/m²    Objective   Physical Exam  Vitals and nursing note reviewed.   Constitutional:       Appearance: He is well-developed.   HENT:      Head: Normocephalic and atraumatic.      Right Ear: External ear normal.      Left Ear:

## 2025-07-30 DIAGNOSIS — H53.9 VISUAL DISTURBANCES: ICD-10-CM

## 2025-07-30 RX ORDER — UBROGEPANT 100 MG/1
1 TABLET ORAL DAILY PRN
Qty: 16 TABLET | Refills: 1 | Status: SHIPPED | OUTPATIENT
Start: 2025-07-30

## 2025-07-30 NOTE — TELEPHONE ENCOUNTER
Date of last visit:  6/24/2025  Date of next visit:  9/29/2025    Requested Prescriptions     Pending Prescriptions Disp Refills    Ubrogepant (UBRELVY) 100 MG TABS 16 tablet 1     Sig: Take 1 tablet by mouth daily as needed (Migraine)